# Patient Record
Sex: FEMALE | Race: WHITE | NOT HISPANIC OR LATINO | Employment: UNEMPLOYED | URBAN - METROPOLITAN AREA
[De-identification: names, ages, dates, MRNs, and addresses within clinical notes are randomized per-mention and may not be internally consistent; named-entity substitution may affect disease eponyms.]

---

## 2023-03-30 ENCOUNTER — HOSPITAL ENCOUNTER (OUTPATIENT)
Dept: RADIOLOGY | Facility: HOSPITAL | Age: 33
Discharge: HOME | End: 2023-03-30
Attending: OBSTETRICS & GYNECOLOGY
Payer: MEDICAID

## 2023-03-30 ENCOUNTER — OFFICE VISIT (OUTPATIENT)
Dept: FAMILY MEDICINE | Facility: CLINIC | Age: 33
End: 2023-03-30
Payer: MEDICAID

## 2023-03-30 VITALS
TEMPERATURE: 98 F | BODY MASS INDEX: 45.21 KG/M2 | OXYGEN SATURATION: 98 % | HEART RATE: 105 BPM | SYSTOLIC BLOOD PRESSURE: 111 MMHG | DIASTOLIC BLOOD PRESSURE: 73 MMHG | WEIGHT: 255.19 LBS | RESPIRATION RATE: 20 BRPM | HEIGHT: 63 IN

## 2023-03-30 DIAGNOSIS — F32.A DEPRESSION DURING PREGNANCY, ANTEPARTUM: ICD-10-CM

## 2023-03-30 DIAGNOSIS — K21.9 GASTROESOPHAGEAL REFLUX DISEASE, UNSPECIFIED WHETHER ESOPHAGITIS PRESENT: ICD-10-CM

## 2023-03-30 DIAGNOSIS — F41.9 ANXIETY DURING PREGNANCY, ANTEPARTUM, UNSPECIFIED TRIMESTER: ICD-10-CM

## 2023-03-30 DIAGNOSIS — G43.919 INTRACTABLE MIGRAINE WITHOUT STATUS MIGRAINOSUS, UNSPECIFIED MIGRAINE TYPE: ICD-10-CM

## 2023-03-30 DIAGNOSIS — Z3A.12 12 WEEKS GESTATION OF PREGNANCY: Primary | ICD-10-CM

## 2023-03-30 DIAGNOSIS — O99.340 DEPRESSION DURING PREGNANCY, ANTEPARTUM: ICD-10-CM

## 2023-03-30 DIAGNOSIS — O99.340 ANXIETY DURING PREGNANCY, ANTEPARTUM, UNSPECIFIED TRIMESTER: ICD-10-CM

## 2023-03-30 DIAGNOSIS — K59.00 CONSTIPATION DURING PREGNANCY IN SECOND TRIMESTER: ICD-10-CM

## 2023-03-30 DIAGNOSIS — O99.612 CONSTIPATION DURING PREGNANCY IN SECOND TRIMESTER: ICD-10-CM

## 2023-03-30 DIAGNOSIS — Z34.90 PREGNANCY: ICD-10-CM

## 2023-03-30 LAB
APPEARANCE UR: CLEAR
BACTERIA #/AREA URNS AUTO: ABNORMAL /HPF
BASOPHILS # BLD AUTO: 0.04 X10(3)/MCL (ref 0–0.2)
BASOPHILS NFR BLD AUTO: 0.5 %
BILIRUB SERPL-MCNC: NEGATIVE MG/DL
BILIRUB UR QL STRIP.AUTO: NEGATIVE MG/DL
BLOOD URINE, POC: NEGATIVE
C TRACH DNA SPEC QL NAA+PROBE: NOT DETECTED
CLARITY, POC UA: CLEAR
COLOR UR AUTO: COLORLESS
COLOR, POC UA: YELLOW
EOSINOPHIL # BLD AUTO: 0.09 X10(3)/MCL (ref 0–0.9)
EOSINOPHIL NFR BLD AUTO: 1.1 %
ERYTHROCYTE [DISTWIDTH] IN BLOOD BY AUTOMATED COUNT: 14.2 % (ref 11.5–17)
GLUCOSE UR QL STRIP.AUTO: NORMAL MG/DL
GLUCOSE UR QL STRIP: NEGATIVE
HBV SURFACE AG SERPL QL IA: NONREACTIVE
HCT VFR BLD AUTO: 38 % (ref 37–47)
HCV AB SERPL QL IA: NONREACTIVE
HGB BLD-MCNC: 12.2 G/DL (ref 12–16)
HGB S BLD QL SOLY: NEGATIVE
HIV 1+2 AB+HIV1 P24 AG SERPL QL IA: NONREACTIVE
HYALINE CASTS #/AREA URNS LPF: ABNORMAL /LPF
IMM GRANULOCYTES # BLD AUTO: 0.03 X10(3)/MCL (ref 0–0.04)
IMM GRANULOCYTES NFR BLD AUTO: 0.4 %
INDIRECT COOMBS GEL: NORMAL
KETONES UR QL STRIP.AUTO: NEGATIVE MG/DL
KETONES UR QL STRIP: NEGATIVE
LEUKOCYTE ESTERASE UR QL STRIP.AUTO: 75 UNIT/L
LEUKOCYTE ESTERASE URINE, POC: NORMAL
LYMPHOCYTES # BLD AUTO: 2.37 X10(3)/MCL (ref 0.6–4.6)
LYMPHOCYTES NFR BLD AUTO: 28.9 %
MCH RBC QN AUTO: 26.2 PG (ref 27–31)
MCHC RBC AUTO-ENTMCNC: 32.1 G/DL (ref 33–36)
MCV RBC AUTO: 81.7 FL (ref 80–94)
MONOCYTES # BLD AUTO: 0.44 X10(3)/MCL (ref 0.1–1.3)
MONOCYTES NFR BLD AUTO: 5.4 %
MUCOUS THREADS URNS QL MICRO: ABNORMAL /LPF
N GONORRHOEA DNA SPEC QL NAA+PROBE: NOT DETECTED
NEUTROPHILS # BLD AUTO: 5.24 X10(3)/MCL (ref 2.1–9.2)
NEUTROPHILS NFR BLD AUTO: 63.7 %
NITRITE UR QL STRIP.AUTO: NEGATIVE
NITRITE, POC UA: NEGATIVE
NRBC BLD AUTO-RTO: 0 %
PH UR STRIP.AUTO: 5.5 [PH]
PH, POC UA: 5.5
PLATELET # BLD AUTO: 358 X10(3)/MCL (ref 130–400)
PMV BLD AUTO: 10.6 FL (ref 7.4–10.4)
PROT UR QL STRIP.AUTO: NEGATIVE MG/DL
PROTEIN, POC: NEGATIVE
RBC # BLD AUTO: 4.65 X10(6)/MCL (ref 4.2–5.4)
RBC #/AREA URNS AUTO: ABNORMAL /HPF
RBC UR QL AUTO: NEGATIVE UNIT/L
SP GR UR STRIP.AUTO: 1.01
SPECIFIC GRAVITY, POC UA: 1.01
SQUAMOUS #/AREA URNS LPF: ABNORMAL /HPF
T PALLIDUM AB SER QL: NONREACTIVE
UROBILINOGEN UR STRIP-ACNC: NORMAL MG/DL
UROBILINOGEN, POC UA: 0.2
WBC # SPEC AUTO: 8.2 X10(3)/MCL (ref 4.5–11.5)
WBC #/AREA URNS AUTO: ABNORMAL /HPF

## 2023-03-30 PROCEDURE — 85660 RBC SICKLE CELL TEST: CPT

## 2023-03-30 PROCEDURE — 86787 VARICELLA-ZOSTER ANTIBODY: CPT | Mod: 90

## 2023-03-30 PROCEDURE — 87591 N.GONORRHOEAE DNA AMP PROB: CPT

## 2023-03-30 PROCEDURE — 81001 URINALYSIS AUTO W/SCOPE: CPT

## 2023-03-30 PROCEDURE — 86850 RBC ANTIBODY SCREEN: CPT

## 2023-03-30 PROCEDURE — 76801 OB US < 14 WKS SINGLE FETUS: CPT | Mod: TC

## 2023-03-30 PROCEDURE — 87340 HEPATITIS B SURFACE AG IA: CPT

## 2023-03-30 PROCEDURE — 86803 HEPATITIS C AB TEST: CPT

## 2023-03-30 PROCEDURE — 99204 OFFICE O/P NEW MOD 45 MIN: CPT | Mod: PBBFAC,25

## 2023-03-30 PROCEDURE — 88174 CYTOPATH C/V AUTO IN FLUID: CPT

## 2023-03-30 PROCEDURE — 81002 URINALYSIS NONAUTO W/O SCOPE: CPT | Mod: 59,PBBFAC

## 2023-03-30 PROCEDURE — 36415 COLL VENOUS BLD VENIPUNCTURE: CPT

## 2023-03-30 PROCEDURE — 86762 RUBELLA ANTIBODY: CPT | Mod: 90

## 2023-03-30 PROCEDURE — 85025 COMPLETE CBC W/AUTO DIFF WBC: CPT

## 2023-03-30 PROCEDURE — 87088 URINE BACTERIA CULTURE: CPT

## 2023-03-30 PROCEDURE — 87389 HIV-1 AG W/HIV-1&-2 AB AG IA: CPT

## 2023-03-30 PROCEDURE — 86900 BLOOD TYPING SEROLOGIC ABO: CPT

## 2023-03-30 PROCEDURE — 86780 TREPONEMA PALLIDUM: CPT

## 2023-03-30 RX ORDER — DOCUSATE SODIUM 100 MG/1
100 CAPSULE, LIQUID FILLED ORAL 2 TIMES DAILY
Qty: 60 CAPSULE | Refills: 0 | Status: SHIPPED | OUTPATIENT
Start: 2023-03-30 | End: 2023-05-24

## 2023-03-30 RX ORDER — SERTRALINE HYDROCHLORIDE 50 MG/1
TABLET, FILM COATED ORAL
Qty: 90 TABLET | Refills: 0 | Status: SHIPPED | OUTPATIENT
Start: 2023-03-30 | End: 2023-06-27 | Stop reason: SDUPTHER

## 2023-03-30 RX ORDER — LANSOPRAZOLE 30 MG/1
30 CAPSULE, DELAYED RELEASE ORAL DAILY
Qty: 30 CAPSULE | Refills: 11 | Status: SHIPPED | OUTPATIENT
Start: 2023-03-30 | End: 2023-11-16

## 2023-03-30 RX ORDER — OFLOXACIN 3 MG/ML
10 SOLUTION AURICULAR (OTIC) DAILY
Qty: 5 ML | Refills: 0 | Status: SHIPPED | OUTPATIENT
Start: 2023-03-30 | End: 2023-04-26
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
## 2023-03-30 NOTE — PATIENT INSTRUCTIONS
Well Child Exam    About this topic  A well child exam is a visit with your child's doctor to check your child's health. The doctor will check your child's growth, progress, and shot record. It is also a time for you to ask your child's doctor any questions you have about your child's health. Your child will have a full exam during the office visit. Other things that are sometimes checked are hearing, eyesight, and urine or blood tests. The doctor may give shots during your child's well visit.    General    Getting Ready for a Well Child Exam    A well child exam is a good time for you to talk with your child's doctor about any of these topics:    Eating habits or diet    How your child acts    Sleep issues    Growth    Safety    Vaccines    Toilet training    Teen years    How your child is doing in school or any learning concerns    Home life    You may want to make a written list of the things you want to talk about with your child's doctor. Be sure to bring your list of questions to your child's well visit. You may also want to do some research on your own before your office visit by reading books or looking at Web sites. Other family members, child caregivers, and grandparents may be able to help you too. Your child's doctor may ask also you about your family's health history or if your child is around anyone who smokes.    The Exam    The doctor measures your child's weight, height, and sometimes head size or body mass index (BMI). The doctor plots these numbers on a growth curve. The growth curve gives a picture of your baby's growth at each visit. The doctor may check your child's temperature, blood pressure, breathing, and heart rate. The doctor may listen to your child's heart, lungs, and belly. Your doctor will do a full exam of your child from the head to the toes.    Growth and Development Questions    Your doctor will ask you about your child's progress. The doctor will focus on the skills that are  likely to happen at your child's age. Some of these are motor skills like rolling over, walking, and running, while others are social skills, or how your child interacts with other people. Your child's doctor will also ask you how your child is doing in school.    Help for Parents    Your doctor will talk with you about any concerns you have about your child during this visit. The doctor may also talk with you about:    Getting family help or other support    Ways to help your child's brain growth    How your child plays and acts with others    Ways to help your child exercise    Safety    Eating habits    Vaccines    Quitting smoking    Help if you have a low mood after having a baby    Shots or Vaccines    It is important for your child to get shots on time. This protects from very serious illnesses like pertussis, measles, or some kinds of pneumonia. Sometimes, your child may need more than one dose of vaccine. The vaccines used today are safer than ever. Talk to your doctor if you have any questions or concerns about giving your child vaccines.    Well Child Exam Schedule    The American Academy of Pediatrics (AAP) suggests this plan for well child visits:    [place] (3 to 5 days old)    1 month old    2 months old    4 months old    6 months old    9 months old    12 months old    15 months old    18 months old    2 years old    30 months old    3 years old    4 years old    Once each year until age 21    Well child exams are very important. Since your child is healthy at this visit and it is scheduled ahead of time, you can think about things you want to ask your child's doctor. Be sure to follow the above plan for well child visits as well as any other visits your child's doctor suggests.    Where can I learn more?    Centers for Disease Control and Prevention    http://www.cdc.gov/vaccines     Healthy  Children    https://www.healthychildren.org/English/family-life/health-management/Pages/Well-Child-Care-A-Check-Up-for-Success.aspx    Disclaimer.  This generalized information is a limited summary of diagnosis, treatment, and/or medication information. It is not meant to be comprehensive and should be used as a tool to help the user understand and/or assess potential diagnostic and treatment options. It does NOT include all information about conditions, treatments, medications, side effects, or risks that may apply to a specific patient. It is not intended to be medical advice or a substitute for the medical advice, diagnosis, or treatment of a health care provider based on the health care provider's examination and assessment of a patients specific and unique circumstances. Patients must speak with a health care provider for complete information about their health, medical questions, and treatment options, including any risks or benefits regarding use of medications. This information does not endorse any treatments or medications as safe, effective, or approved for treating a specific patient. [employer] and its affiliates disclaim any warranty or liability relating to this information or the use thereof. The use of this information is governed by the Terms of Use, available at Terms of Use. ©2022 [employer] and its affiliates and/or licensors. All rights reserved.

## 2023-04-01 LAB
BACTERIA UR CULT: NO GROWTH
RUBV IGG SERPL IA-ACNC: 2
RUBV IGG SERPL QL IA: POSITIVE
VZV IGG SER IA-ACNC: 2.9
VZV IGG SER QL IA: POSITIVE

## 2023-04-03 LAB — ABORH RETYPE: NORMAL

## 2023-04-03 PROCEDURE — 86900 BLOOD TYPING SEROLOGIC ABO: CPT | Performed by: OBSTETRICS & GYNECOLOGY

## 2023-04-04 LAB — PSYCHE PATHOLOGY RESULT: NORMAL

## 2023-04-05 ENCOUNTER — PATIENT OUTREACH (OUTPATIENT)
Dept: FAMILY MEDICINE | Facility: CLINIC | Age: 33
End: 2023-04-05
Payer: MEDICAID

## 2023-04-05 NOTE — PROGRESS NOTES
SDOH Questionnaire  Which of the following best describes your current living situation? (Select ONE only)  [] Live alone in my own home (house, apartment, condo, trailer, etc.)   [x] Live in a household with other people (roommates, family, friends.)  [] Live in a residential facility where meals/ household help are routinely provided by paid staff (or could be if requested)  [] Live in a facility such as a nursing home which provides meals and 24-hour nursing care  [] Temporarily staying with a relative or friend  [] Temporarily staying in a shelter or homeless  [] Other:       Do you have any concerns about your current living situation, like housing conditions, safety, and costs?  []Condition of housing []Lack of more permanent housing []Ability to pay for housing or utilities    []Feeling safe  [x] No concerns    []Other:     In the past 3 months, did you have trouble paying for any of the following? (Select ALL that apply)  []Food   []Housing []Heat and electricity []Medical needs  []Transportation  []Childcare  []None of these  []Other:     In the past 3 months, how often have you worried that your food would run out before you had money to buy more?   [x]Never  []Sometimes []Often  []Very often  Has lack of transportation kept you from medical appointments or doing daily living tasks? (Select ALL that apply)  [] Kept me from medical appointments or from getting medications  [] Kept me from doing things needed for daily living  [x] Not a problem for me    In the last month, how often have you felt difficulties were piling up so high that you could not overcome them?  []Never  []Almost never [x]Sometimes []Fairly often []Very often    Which of the following would you like to receive help with at this time? (Select ALL that apply)  []Food      []Activities of daily living            []Housing     []Childcare/other child-related issues  []Transportation     []Applying for public benefits (SSI, Medicaid,  SNAP)  []Utilities (heat, electricity, water, etc.)  []Legal issues  []Medical care, medicine, medical supplies  []Employment  []Dental services     []Other   []Vision services     [x]None of the above     Who answered these questions?                    CR     Patient alone        Patient with help     Family member, friend, or caregiver of patient         Follow-up: 5/3/2023    Appointment reminder given for 4/24/2023        Patient verbalized understanding.        Other comments:  Identity verified.  Pt denies abdominal pain, vaginal bleeding.  She denies SI/HI.  Instructed if she develops SI/HI to present to the ED for evaluation, educated on 988.  Offered to mail [facility] mental health education/resources, pt accepted.  Pt states she has not had a dental appt in 2 years.  Educated on the benefits of oral cleanings every 6 months.  Offered to mail [facility] oral health education/resources, pt accepted.  Pt states she had an eye exam 1/2023.  Pt states she does not have a PCP.  Educated on the benefits of having a PCP and instructed pt an appt would be made to establish care after she delivers the baby.  Educated on when/where to get medical services.  Patient denies any [facility] barriers at this time. Stressed the importance of medication compliance, keeping appointments and instructed on the use of urgent care clinic for non emergent issues when PCP unavailable.  Patient verbalized understanding to all instructions.                     Education given on

## 2023-04-05 NOTE — PATIENT INSTRUCTIONS
If you have any questions call Daniela [phone].           Why Should I Have My Own Doctor or Nurse Practitioner (PCP) to Take Care of Me  What is a PCP (Primary Care Provider)?    A primary care provider is a doctor or nurse practitioner who you can call for an appointment and will see you when you are sick.    You will also be seen at scheduled appointment times during the year to check on your diabetes, or high blood pressure, or heart disease.    Why see the same PCP (doctor/nurse practitioner)?    You can be seen faster when you are sick           You, the PCP (doctor/nurse practitioner) and the office staff get to know each other; you begin to trust them to care for you. You take part in your health choices.   All of you together are a team.    Your medicine is looked at every time you visit, to be sure you are taking the medicine, as the PCP (doctor/nurse practitioner) ordered.    Your PCP (doctor/nurse practitioner) and their staff help keep you healthy and out of the hospital.  They can catch sicknesses earlier by ordering tests once a year to stop or prevent the sickness from getting worse.      Your PCP (doctor/nurse practitioner) can send you to providers who specialize (heart/bone/lung) if you need.  They and their office staff help keep track of your seeing other providers (doctors/nurse practitioners) and tests (CT/ MRIs/ X Rays)) taken.        PCPs want you to stay healthy.  Let us care for you.                       What Do I Do If I Wake Up Sick                                                     If you wake up sick, or you start to fill sick during the day, try these tips to get care and start to feel better soon.                                                                                                                              As soon as you start to feel bad, call your doctor's office and ask for same day or next day visit appointment.        If you cannot be seen with your doctor's  office within 24 hours, you can go to an urgent care and be seen.          How to stay well:     Take your medicine as ordered by your doctor      Fill your Medicine before you run out      Exercise       Enjoy walking in the sunlight daily  Keep your scheduled clinic appointments      Keep you scheduled yearly wellness visits                Budget-Friendly Healthy Eating    Save money at the grocery store and eat healthy.   Buy groceries keeping your budget in mind  Make a grocery list and only buy what you have on the list  Eat food you cook or have at home; limit fast food or eating out    Compare food labels  Look at store brand food labels and compare to brand names, often food value is the same and the store brand is cheaper      Look for products that do not have sugar, fat, or salt (sodium) added.  These often cost the same but are healthier for you.  They may be labeled as:  ?Sugar-free.  ?Nonfat.              ?Low-fat.  ?Sodium-free.  ?Low sodium.  Look for lean ground beef labeled as at least 92% lean and 8% fat.        Shopping    Buy only the items on your grocery list and go only to the areas of the store that have the items on your list.  Use coupons only for foods and brands you normally buy. Avoid buying items you wouldn't normally buy simply because they are on sale.  Check online and in newspapers for weekly deals.  Buy healthy items from the bulk bins when available, such as herbs, spices, flour, pasta, nuts, and dried fruit.  Buy fruits and vegetables that are in season. Prices are usually lower on in-season produce.  Look at the unit price on the price tag. Use it to compare different brands and sizes to find out which item is the best deal.  Choose healthy items that are often low-cost, such as carrots, potatoes, apples, bananas, and oranges. Dried or canned beans are a low-cost protein source.      Buy in bulk and freeze extra food. Items you can buy in bulk include meats, fish, poultry,  "frozen fruits, and frozen vegetables.  Avoid buying "ready-to-eat" foods, such as pre-cut fruits and vegetables and pre-made salads.  If possible, shop around to discover where you can find the best prices. Consider other retailers such as dollar stores, larger wholesale stores, local fruit and vegetable stands, and farmers markets.  Do not shop when you are hungry. If you shop while hungry, it may be hard to stick to your list and budget.      Resist impulse buying. Use your grocery list as your official plan for the week.      Buy a variety of vegetables and fruits by purchasing fresh, frozen, and canned items.  Look at the top and bottom shelves for deals. Foods at eye level (eye level of an adult or child) are usually more expensive.  Be efficient with your time when shopping. The more time you spend at the store, the more money you are likely to spend.  To save money when choosing more expensive foods like meats and dairy:  ?Choose cheaper cuts of meat, such as bone-in chicken thighs and drumsticks instead of skinless and boneless chicken. When you are ready to prepare the chicken, you can remove the skin yourself to make it healthier.  ?Choose lean meats like chicken or turkey instead of beef.  ?Choose canned seafood, such as tuna, salmon, or sardines.  ?Buy eggs as a low-cost source of protein.  ?Buy dried beans and peas, such as lentils, split peas, or kidney beans instead of meats. Dried beans and peas are a good alternative source of protein.  ?Buy the larger tubs of yogurt instead of individual-sized containers.                        Choose water instead of sodas and other sweetened beverages.  Avoid buying chips, cookies, and other "junk food." These items are usually expensive and not healthy.  Meal planning  Do not eat out or get fast food. Prepare food at home.  Make a grocery list and make sure to bring it with you to the store.   Plan meals and snacks according to a grocery list and budget you " "create.  Use leftovers in your meal plan for the week.  Look for recipes where you can cook once and make enough food for two meals.  Include budget-friendly meals like stews, casseroles, and stir-patel dishes.  Try some meatless meals or try "no cook" meals like salads.  Make sure that half your plate is filled with fruits or vegetables. Choose from fresh, frozen, or canned fruits and vegetables. If eating canned, remember to rinse them before eating. This will remove any excess salt added for packaging.            Summary  Eating healthy on a budget is possible if you plan your meals according to your budget, buy according to your budget and grocery list, and prepare food yourself.   Tips for buying more food on a limited budget include buying generic brands, using coupons only for foods you normally buy, and buying healthy items from the bulk bins when available.  Tips for buying cheaper food to replace expensive food include choosing cheaper, lean cuts of meat, and buying dried beans and peas.    Discuss any question you have with your doctor.              Why is taking care of your mouth/teeth/gums important?     Your mouth is the opening to your body.  If not kept clean, it can let in sickness to the rest of your body.     Oral Health Care (Dentists)                 City:  Provider Address Phone Number Insurance Plan   [place]:  None available                    Padma Gordon, [place] [address] [facility] Padma FAJADRO [phone]   ADULTS ONLY Medicaid:  HB & AETNA ONLY             [place]         Dentures and Dental Service ([facility]) [address] [phone]  DENTURES ONLY ADULT Medicaid:  HB & AETNA ONLY             [facility] [address] [facility] [phone] All Medicaid/Medicare             Talia Gordillo, [place] 4292  [facility] Talia Spear [phone] Medicaid Children only 2 to 21             [place]  "        Rajinder Garcia [facility] [phone] Accepts:   LHC, HB, Aetna         [facility] [phone] Medicare             [facility] [address], [place] [phone] Medicaid Children only 2 to 21             [facility] [address], [place] [phone] Medicaid Children only 2 to 21             [facility]  [address], [place] [phone] Medicaid Children only 5 yrs and younger:  lip/tongue tie             [facility] 13[address] [facility] [phone] Medicaid Children only 2 to 21          [facility]  409 [facility]  [phone]           [facility] 100[address] [facility] [phone] All Medicaid/Medicare :  ADULTS             [facility] 500 [facility] [phone] All Medicaid/Medicare :  ADULTS             [address] [phone] Medicaid Children only 2 to 21             [facility]  [address] [place] [phone] Medicaid Children only 2 to 21             Dr. Nga Larson,  [facility] [phone] Medicare for Dentures Only             [facility] 8762 [facility] [phone] All Medicaid/Medicare :   EXCEPT [facility]; ADULTS         Jose Flores [address] [facility] [phone] Accepts:   LHC, HB, Aetna             [address] [phone]  UHC, IHC, HB, Aetna/Medicare :  ADULTS     [facility]; [place]: [phone]  [facility]; [place]: [phone]                 How does drug/alcohol abuse affect your health?          Drug and alcohol abuse can cause your breathing to slow down enough  for you to stop breathing. It can cause you to see things that are not there. Keep you from sleeping for days.  Continued use of drugs and alcohol will weaken your heart, liver, and kidneys.        778 is the three-digit nationwide phone number to connect directly to crisis lifeline.  BY calling or texting 98, you will connect with mental health professions.  Veterans can press 1 after dialing [phone] to connect directly to Veterans Crisis lifeline.      Mental Health/Substance Abuse  [facility]:  [phone]  [facility]: [phone]  [facility]: [phone]    [facility]  [address] [place] [address]  Phone: [phone]  Fax:  [phone]    [facility]  [address]  [address]  Phone:  [phone]  Fax:  [phone]          [facility]  [address] [place]  [address]  Phone:  [phone]  Fax:  [phone]    [facility]  [address] [place]  [address]  Phone:  [phone]  Fax:  [phone]    [facility]  [address] [place] [address]  Phone: [phone]  Fax:  [phone]    [facility]  [address] [place]  [address]  Phone:  [phone]  Fax: [phone]    [facility]  [address]        [place], [place]  Phone:  [phone]    [facility]       [address]        [place], [place]  [phone]      [facility]   [address] [place] [address]   Phone: [phone]   Medicaid Accepted Plans  Healthy Blue   [facility] on hold at present                                                      Medicaid Accepted Plans    [facility] Healthy Blue   [address] [facility] [place] [address] [facility]   Phone:[phone]     Healthy Blue   [address] [place]   [place] [address] [facility]    Phone:[phone]      [facility] Healthy Blue   [address] Aetna   [address] [facility]   [place] [address] [facility]   Phone:  [phone]      [facility] Aetna   [address] [facility] Healthy Blue   [address] [facility]   [place] [address]    Phone: [phone]      [facility] All   [address] [facility] [place] [address]    Phone: [phone]        [facility] All   [address] [place] [address]    Phone: [phone]         All     [address] [place] [address]    Phone: [phone]     All   [address] [place] [address]    Phone: [phone]        [facility] All   [address] [place] [address]    Phone: [phone]        [facility] All   [address] [facility]    [address]    [place] [address]    Phone: [phone]        [facility] All   [address] [facility]   [address]    [place] [address]    Phone: [phone]        [facility] All   [address] [place] [address]    Phone: [phone]        [address] [place] [address]    Phone: [phone]            [facility] All   [address] [place] [place] [address]    Phone: [phone]              [employer]       Aetna   [address] Healthy Blue   [address] [facility]   [place] [address]    Phone: [phone]        [address] [place] [address] Healthy Blue   Phone: [phone] [facility]       [address] Aetna   [address] Healthy Blue   [place] [address] [facility]   Phone: [phone]        [facility] All   [address] [place] [address]    Phone: [phone] [address] [place] [address]    [phone]        [address] [place] [address]  "[place] [address]    Phone: [phone]        [facility] All   [address]    [address]    [place] [address]    Phone: [phone]    Counseling only can be self-referral        [address] All   [place], [place] [address] [phone]              [address]       All   [address]    [place] [address]    Phone: [phone]          [facility]   [facility]   [address] Healthy Blue   [place] [address] [facility]   Phone: [phone]            [facility]     All   [address]    [address]    [place] [address]    Phone: [phone]    Julianne Rouse/Aliyah Torres      [employer] Medicaid  Substance Abuse Rehabilitation Facility,   [facility]  [facility]  [phone]                   [facility] Medicaid Eye Clinics      [facility]  [address] [place] [address]  Phone: [phone]  (Accepts all Medicaid for visit and glasses)     [facility]  [address] [place] [address]  Phone: [phone]  (Only [facility] Medicaid for visit)  (All plans for glasses)     [facility]   [address] [place] [address]  Phone: [phone]  (All Medicaid plans for visit and glasses)  (Only 1 doctor that comes twice a month)        [facility]  [address] [place] [address]  Phone: [phone]  (No Medicaid for visit)  (Aetna, [facility], [facility] Healthy Blue for glasses)    [facility]  [address] [place] [address]  phone: [phone]  (Accepts all Medicaid)                 [facility]  [address] [address]  phone: [phone]  (Accepts [facility], [facility] and [facility] Medicaid)     [facility]  [address] [place], [place]; [address]  phone" [phone]  (Accepts all Medicaid)              "

## 2023-04-10 NOTE — PROGRESS NOTES
I reviewed History, PE, A/P and medical record.  Services provided in outpatient department of a teaching hospital[facility]facility, I was immediately available.  I agree with resident. Care provided was reasonable and necessary.   I evaluated the patient with resident at time of visit.

## 2023-04-17 DIAGNOSIS — Z3A.15 15 WEEKS GESTATION OF PREGNANCY: Primary | ICD-10-CM

## 2023-04-24 ENCOUNTER — PATIENT OUTREACH (OUTPATIENT)
Dept: FAMILY MEDICINE | Facility: CLINIC | Age: 33
End: 2023-04-24
Payer: MEDICAID

## 2023-04-24 NOTE — PROGRESS NOTES
Follow-up:    Appointment reminder given for Maternal Fetal Medicine ultrasound 4/26/2023 0930, Office Visit 1000 and [facility] OB 1100.  Provided address for Maternal Fetal Medicine Clinic.  Patient verbalized understanding.         Patient verbalized understanding. Yes        Other comments:                    Education given on

## 2023-04-26 ENCOUNTER — PROCEDURE VISIT (OUTPATIENT)
Dept: MATERNAL FETAL MEDICINE | Facility: CLINIC | Age: 33
End: 2023-04-26
Payer: MEDICAID

## 2023-04-26 ENCOUNTER — OFFICE VISIT (OUTPATIENT)
Dept: FAMILY MEDICINE | Facility: CLINIC | Age: 33
End: 2023-04-26
Payer: MEDICAID

## 2023-04-26 ENCOUNTER — OFFICE VISIT (OUTPATIENT)
Dept: MATERNAL FETAL MEDICINE | Facility: CLINIC | Age: 33
End: 2023-04-26
Payer: MEDICAID

## 2023-04-26 VITALS
TEMPERATURE: 98 F | OXYGEN SATURATION: 97 % | HEIGHT: 62 IN | SYSTOLIC BLOOD PRESSURE: 107 MMHG | BODY MASS INDEX: 47.48 KG/M2 | RESPIRATION RATE: 18 BRPM | HEART RATE: 88 BPM | WEIGHT: 258 LBS | DIASTOLIC BLOOD PRESSURE: 72 MMHG

## 2023-04-26 VITALS
SYSTOLIC BLOOD PRESSURE: 135 MMHG | BODY MASS INDEX: 47.39 KG/M2 | HEART RATE: 97 BPM | DIASTOLIC BLOOD PRESSURE: 70 MMHG | WEIGHT: 257.5 LBS | HEIGHT: 62 IN

## 2023-04-26 DIAGNOSIS — O21.9 VOMITING OR NAUSEA OF PREGNANCY: ICD-10-CM

## 2023-04-26 DIAGNOSIS — H43.393 FLOATERS IN VISUAL FIELD, BILATERAL: ICD-10-CM

## 2023-04-26 DIAGNOSIS — F41.9 ANXIETY AND DEPRESSION: ICD-10-CM

## 2023-04-26 DIAGNOSIS — E66.01 OBESITY, CLASS III, BMI 40-49.9 (MORBID OBESITY): ICD-10-CM

## 2023-04-26 DIAGNOSIS — O99.212 OBESITY AFFECTING PREGNANCY IN SECOND TRIMESTER: ICD-10-CM

## 2023-04-26 DIAGNOSIS — O99.342 MENTAL DISORDER DURING PREGNANCY IN SECOND TRIMESTER: ICD-10-CM

## 2023-04-26 DIAGNOSIS — Z3A.15 15 WEEKS GESTATION OF PREGNANCY: ICD-10-CM

## 2023-04-26 DIAGNOSIS — F32.A ANXIETY AND DEPRESSION: ICD-10-CM

## 2023-04-26 DIAGNOSIS — Z86.69 HISTORY OF MIGRAINE: ICD-10-CM

## 2023-04-26 DIAGNOSIS — Z3A.16 16 WEEKS GESTATION OF PREGNANCY: Primary | ICD-10-CM

## 2023-04-26 DIAGNOSIS — O09.299 HISTORY OF CERVICAL INCOMPETENCE IN PREGNANCY, CURRENTLY PREGNANT: ICD-10-CM

## 2023-04-26 DIAGNOSIS — R55 PRE-SYNCOPE: ICD-10-CM

## 2023-04-26 DIAGNOSIS — O09.292 PRIOR POOR OBSTETRICAL HISTORY IN SECOND TRIMESTER, ANTEPARTUM: ICD-10-CM

## 2023-04-26 PROBLEM — O09.212 PREVIOUS PRETERM DELIVERY IN SECOND TRIMESTER, ANTEPARTUM: Status: ACTIVE | Noted: 2023-04-26

## 2023-04-26 LAB
ALBUMIN SERPL-MCNC: 3.1 G/DL (ref 3.5–5)
ALBUMIN/GLOB SERPL: 0.8 RATIO (ref 1.1–2)
ALP SERPL-CCNC: 59 UNIT/L (ref 40–150)
ALT SERPL-CCNC: 12 UNIT/L (ref 0–55)
AST SERPL-CCNC: 16 UNIT/L (ref 5–34)
BASOPHILS # BLD AUTO: 0.04 X10(3)/MCL (ref 0–0.2)
BASOPHILS NFR BLD AUTO: 0.5 %
BILIRUB SERPL-MCNC: NEGATIVE MG/DL
BILIRUBIN DIRECT+TOT PNL SERPL-MCNC: 0.3 MG/DL
BLOOD URINE, POC: NEGATIVE
BUN SERPL-MCNC: 6.8 MG/DL (ref 7–18.7)
CALCIUM SERPL-MCNC: 9 MG/DL (ref 8.4–10.2)
CHLORIDE SERPL-SCNC: 104 MMOL/L (ref 98–107)
CLARITY, POC UA: CLEAR
CO2 SERPL-SCNC: 23 MMOL/L (ref 22–29)
COLOR, POC UA: NORMAL
CREAT SERPL-MCNC: 0.58 MG/DL (ref 0.55–1.02)
EOSINOPHIL # BLD AUTO: 0.08 X10(3)/MCL (ref 0–0.9)
EOSINOPHIL NFR BLD AUTO: 1 %
ERYTHROCYTE [DISTWIDTH] IN BLOOD BY AUTOMATED COUNT: 14.7 % (ref 11.5–17)
GFR SERPLBLD CREATININE-BSD FMLA CKD-EPI: >60 MLS/MIN/1.73/M2
GLOBULIN SER-MCNC: 3.8 GM/DL (ref 2.4–3.5)
GLUCOSE SERPL-MCNC: 69 MG/DL (ref 74–100)
GLUCOSE UR QL STRIP: NEGATIVE
GROUP & RH: NORMAL
HCT VFR BLD AUTO: 36 % (ref 37–47)
HGB BLD-MCNC: 11.5 G/DL (ref 12–16)
IMM GRANULOCYTES # BLD AUTO: 0.03 X10(3)/MCL (ref 0–0.04)
IMM GRANULOCYTES NFR BLD AUTO: 0.4 %
INDIRECT COOMBS GEL: NORMAL
KETONES UR QL STRIP: NEGATIVE
LEUKOCYTE ESTERASE URINE, POC: NEGATIVE
LYMPHOCYTES # BLD AUTO: 2.4 X10(3)/MCL (ref 0.6–4.6)
LYMPHOCYTES NFR BLD AUTO: 29.8 %
MAGNESIUM SERPL-MCNC: 1.9 MG/DL (ref 1.6–2.6)
MCH RBC QN AUTO: 26.4 PG (ref 27–31)
MCHC RBC AUTO-ENTMCNC: 31.9 G/DL (ref 33–36)
MCV RBC AUTO: 82.8 FL (ref 80–94)
MONOCYTES # BLD AUTO: 0.45 X10(3)/MCL (ref 0.1–1.3)
MONOCYTES NFR BLD AUTO: 5.6 %
NEUTROPHILS # BLD AUTO: 5.05 X10(3)/MCL (ref 2.1–9.2)
NEUTROPHILS NFR BLD AUTO: 62.7 %
NITRITE, POC UA: NEGATIVE
NRBC BLD AUTO-RTO: 0 %
PH, POC UA: 8.5
PHOSPHATE SERPL-MCNC: 3.5 MG/DL (ref 2.3–4.7)
PLATELET # BLD AUTO: 298 X10(3)/MCL (ref 130–400)
PMV BLD AUTO: 11 FL (ref 7.4–10.4)
POTASSIUM SERPL-SCNC: 3.7 MMOL/L (ref 3.5–5.1)
PROT SERPL-MCNC: 6.9 GM/DL (ref 6.4–8.3)
PROTEIN, POC: NORMAL
RBC # BLD AUTO: 4.35 X10(6)/MCL (ref 4.2–5.4)
SODIUM SERPL-SCNC: 136 MMOL/L (ref 136–145)
SPECIFIC GRAVITY, POC UA: 1.01
SPECIMEN OUTDATE: NORMAL
TSH SERPL-ACNC: 0.9 UIU/ML (ref 0.35–4.94)
UROBILINOGEN, POC UA: 0.2
WBC # SPEC AUTO: 8.1 X10(3)/MCL (ref 4.5–11.5)

## 2023-04-26 PROCEDURE — 3075F PR MOST RECENT SYSTOLIC BLOOD PRESS GE 130-139MM HG: ICD-10-PCS | Mod: CPTII,S$GLB,, | Performed by: OBSTETRICS & GYNECOLOGY

## 2023-04-26 PROCEDURE — 76815 OB US LIMITED FETUS(S): CPT | Mod: S$GLB,,, | Performed by: OBSTETRICS & GYNECOLOGY

## 2023-04-26 PROCEDURE — 3008F PR BODY MASS INDEX (BMI) DOCUMENTED: ICD-10-PCS | Mod: CPTII,S$GLB,, | Performed by: OBSTETRICS & GYNECOLOGY

## 2023-04-26 PROCEDURE — 76817 PR US, OB, TRANSVAG APPROACH: ICD-10-PCS | Mod: S$GLB,,, | Performed by: OBSTETRICS & GYNECOLOGY

## 2023-04-26 PROCEDURE — 81511 FTL CGEN ABNOR FOUR ANAL: CPT | Mod: 90

## 2023-04-26 PROCEDURE — 85025 COMPLETE CBC W/AUTO DIFF WBC: CPT

## 2023-04-26 PROCEDURE — 86900 BLOOD TYPING SEROLOGIC ABO: CPT | Performed by: STUDENT IN AN ORGANIZED HEALTH CARE EDUCATION/TRAINING PROGRAM

## 2023-04-26 PROCEDURE — 99204 OFFICE O/P NEW MOD 45 MIN: CPT | Mod: 25,TH,S$GLB, | Performed by: OBSTETRICS & GYNECOLOGY

## 2023-04-26 PROCEDURE — 76817 TRANSVAGINAL US OBSTETRIC: CPT | Mod: S$GLB,,, | Performed by: OBSTETRICS & GYNECOLOGY

## 2023-04-26 PROCEDURE — 36415 COLL VENOUS BLD VENIPUNCTURE: CPT

## 2023-04-26 PROCEDURE — 81002 URINALYSIS NONAUTO W/O SCOPE: CPT | Mod: PBBFAC

## 2023-04-26 PROCEDURE — 3078F PR MOST RECENT DIASTOLIC BLOOD PRESSURE < 80 MM HG: ICD-10-PCS | Mod: CPTII,S$GLB,, | Performed by: OBSTETRICS & GYNECOLOGY

## 2023-04-26 PROCEDURE — 84100 ASSAY OF PHOSPHORUS: CPT

## 2023-04-26 PROCEDURE — 76815 PR  US,PREGNANT UTERUS,LIMITED, 1/> FETUSES: ICD-10-PCS | Mod: S$GLB,,, | Performed by: OBSTETRICS & GYNECOLOGY

## 2023-04-26 PROCEDURE — 99215 OFFICE O/P EST HI 40 MIN: CPT | Mod: PBBFAC

## 2023-04-26 PROCEDURE — 83735 ASSAY OF MAGNESIUM: CPT

## 2023-04-26 PROCEDURE — 99204 PR OFFICE/OUTPT VISIT, NEW, LEVL IV, 45-59 MIN: ICD-10-PCS | Mod: 25,TH,S$GLB, | Performed by: OBSTETRICS & GYNECOLOGY

## 2023-04-26 PROCEDURE — 3008F BODY MASS INDEX DOCD: CPT | Mod: CPTII,S$GLB,, | Performed by: OBSTETRICS & GYNECOLOGY

## 2023-04-26 PROCEDURE — 3078F DIAST BP <80 MM HG: CPT | Mod: CPTII,S$GLB,, | Performed by: OBSTETRICS & GYNECOLOGY

## 2023-04-26 PROCEDURE — 3075F SYST BP GE 130 - 139MM HG: CPT | Mod: CPTII,S$GLB,, | Performed by: OBSTETRICS & GYNECOLOGY

## 2023-04-26 PROCEDURE — 84443 ASSAY THYROID STIM HORMONE: CPT

## 2023-04-26 PROCEDURE — 80053 COMPREHEN METABOLIC PANEL: CPT

## 2023-04-26 PROCEDURE — 93005 ELECTROCARDIOGRAM TRACING: CPT

## 2023-04-26 RX ORDER — PYRIDOXINE HCL (VITAMIN B6) 25 MG
25 TABLET ORAL 3 TIMES DAILY
Qty: 90 TABLET | Refills: 0 | Status: SHIPPED | OUTPATIENT
Start: 2023-04-26 | End: 2023-05-26

## 2023-04-26 RX ORDER — DOXYLAMINE SUCCINATE 25 MG/1
25 TABLET ORAL NIGHTLY
Qty: 30 TABLET | Refills: 0 | Status: SHIPPED | OUTPATIENT
Start: 2023-04-26 | End: 2023-05-16

## 2023-04-26 RX ORDER — FLUTICASONE PROPIONATE 50 MCG
SPRAY, SUSPENSION (ML) NASAL
COMMUNITY
Start: 2022-05-16 | End: 2023-11-16

## 2023-04-26 RX ORDER — ONDANSETRON HYDROCHLORIDE 8 MG/1
8 TABLET, FILM COATED ORAL 3 TIMES DAILY
COMMUNITY
Start: 2023-03-29 | End: 2023-09-26

## 2023-04-26 RX ORDER — OMEPRAZOLE 40 MG/1
CAPSULE, DELAYED RELEASE ORAL
COMMUNITY
End: 2023-04-26

## 2023-04-26 RX ORDER — B-COMPLEX WITH VITAMIN C
1 TABLET ORAL
COMMUNITY
Start: 2023-03-30 | End: 2023-09-19 | Stop reason: SDUPTHER

## 2023-04-26 NOTE — ASSESSMENT & PLAN NOTE
She reports a history of anxiety and depression. She is taking Zoloft 50mg daily. She reports improvement of symptoms with the utilization of this medication regimen. Discussed increased risk for peripartum and postpartum mood disorders. Precautions provided.      We discussed the usage of SSRIs in pregnancy and the minimal risks associated with the fetus.      We have discussed the importance of optimization of mental health conditions during pregnancy in an effort to reduce the risk of postpartum mood disorders. We have discussed options for management including psychotherapy, counseling, cognitive behavioral therapy, exercise/yoga, journaling, and psychiatry services. She will notify our office if she is interested in being referred for any of the above.

## 2023-04-26 NOTE — PROGRESS NOTES
MATERNAL-FETAL MEDICINE   CONSULT NOTE    Provider requesting consultation: [facility]    SUBJECTIVE:     Ms. Earlene Camilo is a 32 y.o.  female with IUP at 16w3d who is seen in consultation by MAKAYLA for evaluation and management of:  Problem   1. Prior poor obstetrical history in second trimester, antepartum   2. BMI affecting pregnancy in second trimester   3. Anxiety/depression/hx PP depression during pregnancy in second trimester     She and her mother present today for her appointment due to concerns for history of  birth.  Her pregnancy history is as follows:  G1-18 weeks fetus found not to have heart tones-labor was induced, unknown etiology   G2- labor concerns at 20 weeks-had cerclage placed at that time, delivered full-term  G3-full-term delivery without complications (no cerclage or progesterone)   G4-full-term delivery without complications (no cerclage or progesterone)-postpartum course complicated by meconium aspiration and pneumonia-NICU for 1 month  All deliveries were vaginal.     She has a history of anxiety and depression and takes Zoloft 50 mg daily.  She also has a diagnosis of ADHD and was previously on Adderall which she discontinued at her positive pregnancy test.  She is feeling well and has no issues currently.  She has excellent family support.    She has a latex allergy.         Medication List with Changes/Refills   Current Medications    DOCUSATE SODIUM (COLACE) 100 MG CAPSULE    Take 1 capsule (100 mg total) by mouth 2 (two) times daily.    FLUTICASONE PROPIONATE (FLONASE) 50 MCG/ACTUATION NASAL SPRAY    fluticasone propionate Take 2 Spray(s) (nasal) 1 time per day for 30 days 2 sprays each nostril once daily. 2022 spray,suspension 1 time per day nasal 30 days active 50 mcg/actuation    LANSOPRAZOLE (PREVACID) 30 MG CAPSULE    Take 1 capsule (30 mg total) by mouth once daily.    OFLOXACIN (FLOXIN) 0.3 % OTIC SOLUTION    Place 10 drops into the left ear once daily.     "OMEPRAZOLE (PRILOSEC) 40 MG CAPSULE    1 capsule 30 minutes before morning meal Orally Once a day    ONDANSETRON (ZOFRAN) 8 MG TABLET    Take 8 mg by mouth 3 (three) times daily.    PRENATAL VITAMIN 27 MG IRON- 0.8 MG TAB    Take 1 tablet by mouth.    SERTRALINE (ZOLOFT) 50 MG TABLET    Take 0.5 tablets (25 mg total) by mouth every evening for 7 days, THEN 1 tablet (50 mg total) every evening.       Review of patient's allergies indicates:   Allergen Reactions    Latex, natural rubber Rash       PMH:  Past Medical History:   Diagnosis Date    Anemia     Mental disorder     Postpartum depression        PObHx:  OB History    Para Term  AB Living   5 3 3 0 1 3   SAB IAB Ectopic Multiple Live Births   1 0 0 0 3      # Outcome Date GA Lbr Jarret/2nd Weight Sex Delivery Anes PTL Lv   5 Current            4 Term 18 39w3d  3.317 kg (7 lb 5 oz) F Vag-Spont None Y DINA   3 Term 17 40w0d  3.946 kg (8 lb 11.2 oz) M Vag-Spont EPI N DINA   2 Term 10/04/15 41w5d  3.402 kg (7 lb 8 oz) M Vag-Spont EPI Y DINA   1 SAB 13 18w0d   F    ND       PSH:  Past Surgical History:   Procedure Laterality Date    ORIF, FRACTURE, TIBIAL TUBEROSITY Left        Family history:family history includes Heart attack in her maternal grandfather; Hypertension in her father; Miscarriages / Stillbirths in her sister.    Social history: reports that she quit smoking about 2 months ago. Her smoking use included cigarettes. She has never been exposed to tobacco smoke. She has quit using smokeless tobacco. She reports that she does not currently use alcohol. She reports that she does not currently use drugs after having used the following drugs: Marijuana.    Genetic history: The patient denies any inherited genetic diseases or birth defects in herself or her partner's personal history or family.    Objective:   /70 (BP Location: Left arm)   Pulse 97   Ht 5' 2" (1.575 m)   Wt 116.8 kg (257 lb 8 oz)   LMP  (LMP Unknown)   BMI " 47.10 kg/m²     Ultrasound performed. See viewpoint for full ultrasound report.    A varner living IUP is identified, and the biometry is appropriate for established gestational age.    Early, limited anatomy appears normal. The placenta is anterior.    Transvaginal cervical length was 3.9cm.     ASSESSMENT/PLAN:     32 y.o.  female with IUP at 16w3d     1. Prior poor obstetrical history in second trimester, antepartum  With her 1st pregnancy, she delivered at 18 weeks' gestation.  She reports that the baby was not growing properly and upon attending a routine OB visit, the baby no longer had a heartbeat.  She was sent to the OB unit for induction.  She denies any genetic testing being performed on the baby after delivery.  She is unsure if she had any APLS testing completed.  Of note, she had 3 successful pregnancies afterward.    With her 2nd pregnancy, she reports she went into labor at 20 weeks and a cerclage was placed at that time.  She was able to successfully carry to term.  In her 3rd and 4th pregnancies, she reports her physician monitoring her cervix and never needing cerclage placement.  She did not take progesterone for either pregnancy.  She delivered both of these pregnancies at term.    Recommendations:  -Serial cervical surveillance (scheduled through [facility] office)  -Without prior  delivery, progesterone not recommended at this time.  If a shortened cervix would be detected at any point, we will consider it at that time.        2. BMI affecting pregnancy in second trimester  The patient was counseled on the  risks associated with obesity which include and increased risk of hypertension, preeclampsia, gestational diabetes, venous thromboembolic disease,  delivery, macrosomia (with resultant shoulder dystocia, obstetric sphincter injury), IUFD, longer first stage of labor, decreased TOLAC success rate, emergent & scheduled  & complications of   (prolonged OR time, delayed delivery, excessive EBL, infection, wound separation/infection, higher spinal failure rate, more difficult intubation).  Obesity is also associated with fetal anomalies, specifically neural tube defects.  Studies have shown that the rate of complication increases with rising BMI and thus pregnancies with maternal morbid obesity are at increased risk.      BMI 47    Recommendations:  Discussed that TWG goal is 11-20 lbs  Screen for signs/symptoms of obstructive sleep apnea  Nutritionist consult offered (this is to be ordered by primary OB provider)  Consider early 1 hr GCT (between 14-16 weeks gestation); repeat at 24-28 weeks gestation  Start low dose aspirin 81 mg daily at 12-16 weeks for preeclampsia risk reduction  Targeted anatomical survey scheduled at 18-20 weeks  Fetal growth ultrasound at 32 weeks if BMI >= 40  Weekly  testing at 32 weeks if pre-pregnancy BMI >= 45  Lovenox 40 mg BID for VTE prophylaxis while admitted to the hospital (antepartum or postpartum) if BMI >= 40.   Encouraged breastfeeding  Postpartum lifestyle modifications & weight loss      3. Anxiety/depression/hx PP depression during pregnancy in second trimester  She reports a history of anxiety and depression. She is taking Zoloft 50mg daily. She reports improvement of symptoms with the utilization of this medication regimen. Discussed increased risk for peripartum and postpartum mood disorders. Precautions provided.      We discussed the usage of SSRIs in pregnancy and the minimal risks associated with the fetus.      We have discussed the importance of optimization of mental health conditions during pregnancy in an effort to reduce the risk of postpartum mood disorders. We have discussed options for management including psychotherapy, counseling, cognitive behavioral therapy, exercise/yoga, journaling, and psychiatry services. She will notify our office if she is interested in being referred for any of  the above.        FOLLOW UP:   F/u in 2 weeks for cx length only  F/u in 4 weeks for US/MFM visit    This consultation was completed with the assistance of Leanna Francis NP.      Lilly Cheatham MD  Maternal Fetal Medicine         Electronically Signed by Leanna Francis April 26, 2023

## 2023-04-26 NOTE — ASSESSMENT & PLAN NOTE
With her 1st pregnancy, she delivered at 18 weeks' gestation.  She reports that the baby was not growing properly and upon attending a routine OB visit, the baby no longer had a heartbeat.  She was sent to the OB unit for induction.  She denies any genetic testing being performed on the baby after delivery.  She is unsure if she had any APLS testing completed.  Of note, she had 3 successful pregnancies afterward.    With her 2nd pregnancy, she reports she went into labor at 20 weeks and a cerclage was placed at that time.  She was able to successfully carry to term.  In her 3rd and 4th pregnancies, she reports her physician monitoring her cervix and never needing cerclage placement.  She did not take progesterone for either pregnancy.  She delivered both of these pregnancies at term.    Recommendations:  -Serial cervical surveillance (scheduled through [facility] office)  -Without prior  delivery, progesterone not recommended at this time.  If a shortened cervix would be detected at any point, we will consider it at that time.

## 2023-04-26 NOTE — ASSESSMENT & PLAN NOTE
The patient was counseled on the  risks associated with obesity which include and increased risk of hypertension, preeclampsia, gestational diabetes, venous thromboembolic disease,  delivery, macrosomia (with resultant shoulder dystocia, obstetric sphincter injury), IUFD, longer first stage of labor, decreased TOLAC success rate, emergent & scheduled  & complications of  (prolonged OR time, delayed delivery, excessive EBL, infection, wound separation/infection, higher spinal failure rate, more difficult intubation).  Obesity is also associated with fetal anomalies, specifically neural tube defects.  Studies have shown that the rate of complication increases with rising BMI and thus pregnancies with maternal morbid obesity are at increased risk.      BMI 47    Recommendations:   Discussed that TWG goal is 11-20 lbs   Screen for signs/symptoms of obstructive sleep apnea   Nutritionist consult offered (this is to be ordered by primary OB provider)   Consider early 1 hr GCT (between 14-16 weeks gestation); repeat at 24-28 weeks gestation   Start low dose aspirin 81 mg daily at 12-16 weeks for preeclampsia risk reduction   Targeted anatomical survey scheduled at 18-20 weeks   Fetal growth ultrasound at 32 weeks if BMI >= 40   Weekly  testing at 32 weeks if pre-pregnancy BMI >= 45   Lovenox 40 mg BID for VTE prophylaxis while admitted to the hospital (antepartum or postpartum) if BMI >= 40.    Encouraged breastfeeding   Postpartum lifestyle modifications & weight loss

## 2023-04-26 NOTE — PROGRESS NOTES
[facility] OB OFFICE VISIT NOTE  Earlene Camilo  [id number]  2023      Chief Complaint: Routine Prenatal Visit (16 weeks pregnant and 3 days)    Earlene Camilo is a 32 y.o.  female 16w3d by 2nd trimester US (CHERELLE Estimated Date of Delivery: 10/8/23) presenting to [facility] for\ routine OB vist.     Acute Issues: Denies any acute issues. Taking PNV and began ASA 81 mg per MFM.     Chronic Issues:   Dizziness w/ imposed Pre-Syncope/Syncope: Has happened twice since last appt. Last approx 1 week ago. Felt faint and lost balance, but didn't black out. Denies any injuries or head trauma. Gets dizzy when going from sitting to standing quickly. Denies any CP, SOB, palpitations, tinnitus, ear pain, blurry vision, easy bruising, vaginal bleeding, dysuria, urinary urgency/frequency, polyuria, polydipsia, excessive fatigue, or  diarrhea.  + frequent nausea and occasional emesis throughout the day--> poor appetite, using Prevacid and PRN Zofran. Notes good PO liquid intake.  + Has hx of migraine HA and floaters in vision, but denies increased severity or frequency of symptoms or association w/ above event.     Floaters: Reports she was told she had papilledema that causes her floaters, was seen by Optho (doesn't remember name). Sees an optometrist regularly. Pt would like to try to establish w/ previous Optho physician and declines referral at this time.  Denies previous diagnosis of Pseudotumor Cerebri.     GERD: Using Prevacid as noted above, doesn't help w/ nausea/vomitting but denies sig acid reflux/heart burn. .  Doesn't eat small frequent meals or avoid spicy/acidic foods.     Depression/Anxiety: Taking Zolfot 50 mg qd, believes medication is helping. However, has occassional teary spells and irritability (primarily regarding discipling her children). Also notes ocassional depressed mood w/ anhedonia. Denies SI/HI/Self-Harm. Open to therapy referral,b ut doesn't want dose adjustment in Zoloft at this time.     OB  "Review of Systems:     - Fetal movements: Yes   - Vaginal bleeding: No  - Vaginal discharge: No  - Loss of fluid: No  - Contractions: No  - Headaches: Yes, frontal, mild to severe, 2-3 x a week. Doesn't take any medications 2/2 afraid of fetal side effects.    - Vision changes: Chronic floaters, a couple times a week. Denies any change in severity/frequency since pregnancy.   - Edema: intermittent     Blood pressure 107/72, pulse 88, temperature 98.1 °F (36.7 °C), temperature source Oral, resp. rate 18, height 5' 2" (1.575 m), weight 117 kg (258 lb), SpO2 97 %.     Physical Exam   General: in no acute distress. VS wnl. AF.   HEENT: PEERLA, EOMI, Visual Fields bilat intact w/o deficit. Pt wearing glasses.   RESP: clear to auscultation bilaterally, non labored  CV: regular rate and rhythm, no murmurs, no edema  ABD: gravid, nontender, BS+  FHTs: 140s bpm  Fundal height: Unable to ascertain 2/2 body habitus     Current Medications:   Current Outpatient Medications on File Prior to Visit   Medication Sig Dispense Refill    docusate sodium (COLACE) 100 MG capsule Take 1 capsule (100 mg total) by mouth 2 (two) times daily. (Patient not taking: Reported on 4/26/2023) 60 capsule 0    lansoprazole (PREVACID) 30 MG capsule Take 1 capsule (30 mg total) by mouth once daily. 30 capsule 11    sertraline (ZOLOFT) 50 MG tablet Take 0.5 tablets (25 mg total) by mouth every evening for 7 days, THEN 1 tablet (50 mg total) every evening. 90 tablet 0    [DISCONTINUED] ofloxacin (FLOXIN) 0.3 % otic solution Place 10 drops into the left ear once daily. (Patient not taking: Reported on 4/26/2023) 5 mL 0     No current facility-administered medications on file prior to visit.       Labs:    Initial OB Labs:  - Blood Type and Rh: Not done, ordered 4/26/23.   - Antibody Screen: Negative    - CBC H/H: 11.5/36.0   - HIV: NR   - Syphilis Ab:  NR   - GC:  Negative   - CT: Negative   - HBsAg: Negative   - HCVAb: Negative   - Rubella: Immune   - " Varicella: Immune  - UA & Culture:  NG   - Sickle Cell Screen: Negative   - PAP: NIL, HPV -   - Influenza vaccine date:N/A   - BTL desired: Yes.     15-20 Weeks Lab  - Quad Screen:     28 Week Lab  - 1H GTT:   - Rhogam:   - Date of Tdap:   - CBC H/H:   - Syphilis Ab:   - BTL consent:     36 Week Lab  - CBC H/H:   - Syphilis Ab:   - GBS Culture:   - HIV:   - Cervical GC:   - Cervical CT:     Imaging:   Initial US :   FINDINGS:  The uterus measures 13.9 x 6.9 x 9.4 cm ovaries were not clearly identified  Multiple transverse and sagittal gray scale sonographic images were acquired through the pelvis. There is a single fetus in the uterus in the indeterminate position with an observed heart rate of 155 BPM.  The crown rump length is 6.23 cm equivalent to 12 weeks and 4 days.  No other significant abnormalities are identified     Impression:  Single intrauterine pregnancy with a viable fetus with a fetal heart rate of 155 beats per minute.  Estimated age by ultrasound 12 weeks and 4 days +/-1 week 1 day.  Estimated date of delivery August 8, 2023 4/26/2023  Impression   =========   A varner living IUP is identified, and the biometry is appropriate for established gestational age. Early, limited anatomy appears normal. The placenta is anterior.   Transvaginal cervical length was 3.9cm.        Urine Dip (4/26/2023):     Component 11:28   Color, UA Dark Yellow    pH, UA 8.5    WBC, UA Negative    Nitrite, UA Negative    Protein, POC Trace    Glucose, UA Negative    Ketones, UA Negative    Urobilinogen, UA 0.2    Bilirubin, POC Negative    Blood, UA Negative    Clarity, UA Clear    Spec Grav UA 1.015        Assessment:   1. 16 weeks gestation of pregnancy    2. Pre-syncope    3. History of migraine    4. Floaters in visual field, bilateral    5. Vomiting or nausea of pregnancy    6. Anxiety and depression    7. History of cervical incompetence in pregnancy, currently pregnant    8. Obesity, Class III, BMI 40-49.9  (morbid obesity)        Plan:    16 WGA   - OB Protocol, PNVs, ASA 81 mg for pre-e risk reduction   - Urine dip reviewed as above  - Routine (initial) labs: reviewed, as above.   - Mother plans to breastfeed  - Postpartum contraception discussion: BTL desired, need to sign at/near 28 WGA visit.   - Labor and 2nd trimester of pregnancy precautions discussed in depth.     Pre-Syncope   -Orthostatic vitals negative in clinic, but pt BP is low normal. EKG done and WNL. NO abnormalities noted on exam. Ordered CMP, Mg, Phos, CBC, and TSH.   -Counseled pt on avoiding quick position changes  and rising slowly from sitting, since these seem to be triggers. Counseled on good hydration and small frequent meals. Gave 1st line tx of n/v of pregnancy and continue Prevacid to improve impediments to eating. Safety precautions given.     Headache Disorder   Visual Floaters   -Counseled on hydration, good PO intake, avoidance of known triggers. Can take Prn Tylenol for HA, be cautious of Tylenol overuse HA. Pt used to take Fioricet in past, can consider if Tylenol not effective.   -Pt with floaters and reports possible hx of papilledema. Recommend Optho f/u. Pt would like to f/u with her previous Optho via her Optometrist, expressed understanding. If pt hasn't established care by next appt, consider [facility] Optho referral.  RTC/ED precautions given for progressive symptoms.   -Gave Pre-Eclampsia precautions. Pt has BP cuff at home. Recommend to check BP throughout pregnancy if symptomatic and RTC/ED if > 140/90.     N/V of Pregnancy   GERD   -Continue Prevacid. Will add Vitamin B6 and Unisom to regimen. Counseled on diet/lifestyle modifications. Will CTM. RTC/ED precautions given .     Anxiety/Depression   -Pt w/ some depressive/anxiety symptoms, offered increase of Zoloft but pt doesn't desire at this time. Referral placed to Behavioral health for CBT. Pt w/ good family support via fiance and mother.  No SI/HI/Self harm at this time.  Gave RTC/ED precautions.     Hx of Cervical Insufficiency   -Had 2 deliveries s/p a delivery that required Cerclage Placement. Followed by [facility], saw today. F/u in 2 weeks for cx length and f/u in 4 weeks for US/MFM visit. Pt aware of appts.     Class III Obesity   -Pt following w/ MFM, see note for recommendations.     - Return to clinic in 2 weeks for routine follow-up to monitor above issues.     Hiral May MD  [employer]  Resident, -III

## 2023-04-27 NOTE — PROGRESS NOTES
Discussed with resident at time of encounter 04-26-23.  IUP @ 16 weeks.  Resident's note reviewed 04-27-23.  Agree with assessment; plan of care appropriate.  Professional services provided in an outpatient primary care center affiliated with a [facility] institution.

## 2023-05-01 LAB
# FETUSES: NORMAL
2ND TRIMESTER 4 SCREEN SERPL-IMP: NORMAL
AFP ADJ MOM SERPL: 1.09 MOM
AFP SERPL IA-MCNC: 26.5 NG/ML
AGE AT DELIVERY: NORMAL
B-HCG ADJ MOM SERPL: 1.45 MOM
CHORION TYPE: NORMAL
COLLECT DATE: NORMAL
CURRENT SMOKER: NORMAL
FET TS 21 RISK FROM MAT AGE: NORMAL
GA METHOD: NORMAL
GA US.COMPOSITE.EST: NORMAL WK,D
HCG SERPL IA-ACNC: 33.5 IU/ML
HX OF NTD QL: NO
HX OF NTD QL: NO
HX OF TRISOMY 21 QL: NO
IDDM PATIENT QL: NO
INHIBIN A ADJ MOM SERPL: 0.47 MOM
INHIBIN SERPL-MCNC: 52 PG/ML
IVF PREGNANCY: NO
LABORATORY COMMENT REPORT: NORMAL
M PHYSICIAN PHONE NUMBER: NORMAL
MATERNAL RISK FACTORS: NORMAL
NEURAL TUBE DEFECT RISK FETUS: NORMAL %
RECOM F/U: NORMAL
TEST PERFORMANCE INFO SPEC: NORMAL
TS 18 RISK FETUS: NORMAL
TS 21 RISK FETUS: NORMAL
U ESTRIOL ADJ MOM SERPL: 1.24 MOM
U ESTRIOL SERPL-MCNC: 0.98 NG/ML

## 2023-05-03 ENCOUNTER — PATIENT OUTREACH (OUTPATIENT)
Dept: FAMILY MEDICINE | Facility: CLINIC | Age: 33
End: 2023-05-03
Payer: MEDICAID

## 2023-05-03 DIAGNOSIS — O99.212 OBESITY AFFECTING PREGNANCY IN SECOND TRIMESTER: ICD-10-CM

## 2023-05-03 DIAGNOSIS — O99.342 MENTAL DISORDER DURING PREGNANCY IN SECOND TRIMESTER: ICD-10-CM

## 2023-05-03 DIAGNOSIS — O09.292 PRIOR POOR OBSTETRICAL HISTORY IN SECOND TRIMESTER, ANTEPARTUM: Primary | ICD-10-CM

## 2023-05-04 NOTE — PROGRESS NOTES
Follow-up: 6/1/2023    Appointment reminder: 5/22/2023        Patient verbalized understanding: Yes        Other comments:  Identity verified.  Pt states she had some abdominal pain recently due to the need for a BM.  She states she had a BM and the cramps resolved.  She states she is taking a stool softener BID.  Instructed to eat lots of fresh fruit vegetables and drink plenty of water.  Pt denies vaginal bleeding, vision changes.  She states she has a headache on occasion that is resolved with OTC medication.  Pt denies SI/HI.  Pt states she has felt some flutters from the baby moving.  Patient denies any SDOH barriers at this time. Pt had an OB appt  4/26/23 was given prescriptions for B6, Unisom, referral to behavioral health and RTC 2 weeks (5/9/2023 1000).  Pt had maternal fetal medicine ultrasound and office visit 4/26/2023.  She was started on ASA 81 mg daily, RTC 2 weeks for ultrasound (5/10/2023 1130) and office visit with ultrasound in 4 weeks 95/24/23 0900 ultrasound, 0930 office visit).  Instructed to present to the ED for vaginal bleeding, leaking fluid, abdominal pain, contractions, SOB, CP, edema of the extremities, severe HA with vision changes unresolved with medication and decreased fetal movement.  Stressed the importance of medication compliance, keeping appointments and reinforced the use of urgent care clinic for non emergent issues until PCP established.  Patient verbalized understanding to all instructions.                      Education given on: healthy diet, OB precautions, keeping appts, medication compliance

## 2023-05-09 ENCOUNTER — OFFICE VISIT (OUTPATIENT)
Dept: FAMILY MEDICINE | Facility: CLINIC | Age: 33
End: 2023-05-09
Payer: MEDICAID

## 2023-05-09 VITALS
HEIGHT: 62 IN | RESPIRATION RATE: 20 BRPM | SYSTOLIC BLOOD PRESSURE: 96 MMHG | OXYGEN SATURATION: 99 % | BODY MASS INDEX: 47.51 KG/M2 | DIASTOLIC BLOOD PRESSURE: 61 MMHG | HEART RATE: 87 BPM | WEIGHT: 258.19 LBS | TEMPERATURE: 98 F

## 2023-05-09 DIAGNOSIS — F41.9 ANXIETY AND DEPRESSION: ICD-10-CM

## 2023-05-09 DIAGNOSIS — N89.8 VAGINAL ITCHING: ICD-10-CM

## 2023-05-09 DIAGNOSIS — F32.A ANXIETY AND DEPRESSION: ICD-10-CM

## 2023-05-09 DIAGNOSIS — R55 PRE-SYNCOPE: ICD-10-CM

## 2023-05-09 DIAGNOSIS — Z3A.18 18 WEEKS GESTATION OF PREGNANCY: Primary | ICD-10-CM

## 2023-05-09 DIAGNOSIS — R10.2 VAGINAL PAIN: ICD-10-CM

## 2023-05-09 DIAGNOSIS — H43.393 FLOATERS IN VISUAL FIELD, BILATERAL: ICD-10-CM

## 2023-05-09 DIAGNOSIS — O21.9 VOMITING OR NAUSEA OF PREGNANCY: ICD-10-CM

## 2023-05-09 LAB
BILIRUB SERPL-MCNC: NORMAL MG/DL
BLOOD URINE, POC: NORMAL
C TRACH DNA SPEC QL NAA+PROBE: NOT DETECTED
CLARITY, POC UA: NORMAL
CLUE CELLS VAG QL WET PREP: ABNORMAL
COLOR, POC UA: YELLOW
GLUCOSE UR QL STRIP: NORMAL
KETONES UR QL STRIP: NORMAL
LEUKOCYTE ESTERASE URINE, POC: NORMAL
N GONORRHOEA DNA SPEC QL NAA+PROBE: NOT DETECTED
NITRITE, POC UA: NORMAL
PH, POC UA: 8.5
PROTEIN, POC: NORMAL
SPECIFIC GRAVITY, POC UA: 1.02
T VAGINALIS VAG QL WET PREP: ABNORMAL
UROBILINOGEN, POC UA: 0.2
WBC #/AREA VAG WET PREP: ABNORMAL
YEAST SPEC QL WET PREP: ABNORMAL

## 2023-05-09 PROCEDURE — 87255 GENET VIRUS ISOLATE HSV: CPT | Mod: 90

## 2023-05-09 PROCEDURE — 99214 OFFICE O/P EST MOD 30 MIN: CPT | Mod: PBBFAC

## 2023-05-09 PROCEDURE — 81002 URINALYSIS NONAUTO W/O SCOPE: CPT | Mod: PBBFAC

## 2023-05-09 PROCEDURE — 87210 SMEAR WET MOUNT SALINE/INK: CPT

## 2023-05-09 PROCEDURE — 87591 N.GONORRHOEAE DNA AMP PROB: CPT

## 2023-05-09 NOTE — PROGRESS NOTES
[facility] OB OFFICE VISIT NOTE  Earlene Camilo  [id number]  2023      Chief Complaint: Follow-up (18 week o.b states vaginal itching ,no vag odor or bleeding)    Earlene Camilo is a 32 y.o.  female 18w2d by 2nd trimester US (CHERELLE Estimated Date of Delivery: 10/8/23) presenting to [facility] for\ routine OB vist.     Acute Issues: vaginal itching started couple days ago, clear with occasional yellow color, starting to hurt, but not with urinating; more with sitting down  - still having sex, last intercourse ; sxs started before that  - CT in the past, s/p tx 15yrs ago    . Taking PNV and continuing ASA 81 mg per MFM.     Chronic Issues:   Dizziness w/ imposed Pre-Syncope/Syncope: Still having eps daily, particularly when raising voice. Maintain hydration - drinks 20oz bottle x8-9 daily; recently craving black pepper, h/o KWAKU in prev pregnancy. Poor appetite noted from last visit, glucose noted to be low at 69 on CMP last visit. Sxs improve with orange juice. Has glucometer at home but unsure how to use.    Floaters: Reports she was told she had papilledema that causes her floaters, was seen by Optho (doesn't remember name). Sees an optometrist regularly. Pt attempted to call prev ophtho and currently waiting to hear back    GERD: Using Prevacid, vit b6, doxalymine     Depression/Anxiety: Taking Zolfot 50 mg qd, believes medication is helping. Open to therapy referral,b ut doesn't want dose adjustment in Zoloft at this time.     OB Review of Systems:   - Fetal movements: not really, have felt a few times   - Vaginal bleeding: No  - Vaginal discharge: yes see above  - Loss of fluid: No  - Contractions: No, stretching pain  - Headaches: Yes, frontal, mild to severe, 2-3 x a week. Doesn't take any medications 2/2 afraid of fetal side effects.  Resolves with rest  - Vision changes: Chronic floaters, a couple times a week. Denies any change in severity/frequency since pregnancy.   - Edema: intermittent,  "improves with elevation    Blood pressure 96/61, pulse 87, temperature 98.1 °F (36.7 °C), temperature source Oral, resp. rate 20, height 5' 2" (1.575 m), weight 117.1 kg (258 lb 3.2 oz), SpO2 99 %.     Physical Exam   General: in no acute distress. VS wnl. AF.   HEENT: PEERLA, EOMI, Visual Fields bilat intact w/o deficit. Pt wearing glasses.   RESP: clear to auscultation bilaterally, non labored  CV: regular rate and rhythm, no murmurs, no edema  ABD: gravid, nontender, BS+  FHTs: 147-148s bpm  Fundal height: approx 18, accuracy difficult 2/2 body habitus  : sore noted to L side of clitoral region, white milky discharge in vaginal vault, no blood noted, cervical os difficult to visualize    Current Medications:   Current Outpatient Medications on File Prior to Visit   Medication Sig Dispense Refill    doxylamine succinate (UNISOM, DOXYLAMINE,) 25 mg tablet Take 1 tablet (25 mg total) by mouth every evening. Medication is sedating. 30 tablet 0    fluticasone propionate (FLONASE) 50 mcg/actuation nasal spray fluticasone propionate Take 2 Spray(s) (nasal) 1 time per day for 30 days 2 sprays each nostril once daily. 20220516 spray,suspension 1 time per day nasal 30 days active 50 mcg/actuation      lansoprazole (PREVACID) 30 MG capsule Take 1 capsule (30 mg total) by mouth once daily. 30 capsule 11    ondansetron (ZOFRAN) 8 MG tablet Take 8 mg by mouth 3 (three) times daily.      PRENATAL VITAMIN 27 mg iron- 0.8 mg Tab Take 1 tablet by mouth.      pyridoxine, vitamin B6, (B-6) 25 MG Tab Take 1 tablet (25 mg total) by mouth 3 (three) times daily. 90 tablet 0    sertraline (ZOLOFT) 50 MG tablet Take 0.5 tablets (25 mg total) by mouth every evening for 7 days, THEN 1 tablet (50 mg total) every evening. 90 tablet 0     No current facility-administered medications on file prior to visit.     Labs:    Initial OB Labs:  - Blood Type and Rh: Not done, ordered 4/26/23.   - Antibody Screen: Negative    - CBC H/H: 11.5/36.0   - " HIV: NR   - Syphilis Ab:  NR   - GC:  Negative   - CT: Negative   - HBsAg: Negative   - HCVAb: Negative   - Rubella: Immune   - Varicella: Immune  - UA & Culture:  NG   - Sickle Cell Screen: Negative   - PAP: NIL, HPV -   - Influenza vaccine date:N/A   - BTL desired: Yes.     15-20 Weeks Lab  - Quad Screen: normal risk    28 Week Lab  - 1H GTT:   - Rhogam:   - Date of Tdap:   - CBC H/H:   - Syphilis Ab:   - BTL consent:     36 Week Lab  - CBC H/H:   - Syphilis Ab:   - GBS Culture:   - HIV:   - Cervical GC:   - Cervical CT:     Imaging:   Initial US :   FINDINGS:  The uterus measures 13.9 x 6.9 x 9.4 cm ovaries were not clearly identified  Multiple transverse and sagittal gray scale sonographic images were acquired through the pelvis. There is a single fetus in the uterus in the indeterminate position with an observed heart rate of 155 BPM.  The crown rump length is 6.23 cm equivalent to 12 weeks and 4 days.  No other significant abnormalities are identified     Impression:  Single intrauterine pregnancy with a viable fetus with a fetal heart rate of 155 beats per minute.  Estimated age by ultrasound 12 weeks and 4 days +/-1 week 1 day.  Estimated date of delivery August 8, 2023 4/26/2023  Impression   =========   A varner living IUP is identified, and the biometry is appropriate for established gestational age. Early, limited anatomy appears normal. The placenta is anterior.   Transvaginal cervical length was 3.9cm.      Assessment:   1. 18 weeks gestation of pregnancy    2. Vaginal itching    3. Vaginal pain    4. Floaters in visual field, bilateral    5. Pre-syncope    6. Anxiety and depression    7. Vomiting or nausea of pregnancy        Plan:    18 WGA   - OB Protocol, PNVs, ASA 81 mg for pre-e risk reduction   - Urine dip reviewed as above  - Routine (initial) labs: reviewed, as above.   - Mother plans to breastfeed  - Postpartum contraception discussion: BTL desired, need to sign at/near 28 WGA visit.    - Labor and 2nd trimester of pregnancy precautions discussed in depth.     Vaginal itch/pain  - lesion as exam above  - HSV swab obtained - no known h/o herpes in the past; same partner for 15yrs  - RPR, GC/CT, and wet prep also obtained    Pre-Syncope   -CMP, Mg, Phos, CBC, and TSH obtained last visit - reviewed, wnl with exception of glucose being 69; pt improves with orange juice at home; hypoglycemia may be contributing to sxs; encouraged to maintain po intake, nausea and vomiting may be contributing  - hydration appears adequate per hx  - went over how to check CBG at home and use glucometer - pt has supplies at home    Headache Disorder   Visual Floaters   -Counseled on hydration, good PO intake, avoidance of known triggers. Pt avoiding tylenol due to concerns of ADHD with tylenol during pregnancy and already have 2 kids with ADHD at home.  -Pt with floaters and reports possible hx of papilledema. Pt has reached out for appt but no callback from prev ophtho yet.  RTC/ED precautions given for progressive symptoms.   -Gave Pre-Eclampsia precautions. Pt has BP cuff at home. Recommend to check BP throughout pregnancy if symptomatic and RTC/ED if > 140/90.     N/V of Pregnancy   GERD   -Continue Prevacid. Vitamin B6 and Unisom added to regimen; improving Counseled on diet/lifestyle modifications. Cont to moniotr. RTC/ED precautions given.     Anxiety/Depression   -Pt w/ some depressive/anxiety symptoms, pt happy with current dose. Will cont at this time    Hx of Cervical Insufficiency   -Had 2 deliveries s/p a delivery that required Cerclage Placement. Followed by [facility] MFM - rec'd against tx with progesterone at this time, but continuing cervical length surveillance with serial US.     Class III Obesity   -Pt following w/ MFM, see note for recommendations.     - Return to clinic in 2 weeks - will sent to high risk for evaluation given current MFM following

## 2023-05-10 ENCOUNTER — PROCEDURE VISIT (OUTPATIENT)
Dept: MATERNAL FETAL MEDICINE | Facility: CLINIC | Age: 33
End: 2023-05-10
Payer: MEDICAID

## 2023-05-10 DIAGNOSIS — O99.342 MENTAL DISORDER DURING PREGNANCY IN SECOND TRIMESTER: ICD-10-CM

## 2023-05-10 DIAGNOSIS — O09.292 PRIOR POOR OBSTETRICAL HISTORY IN SECOND TRIMESTER, ANTEPARTUM: ICD-10-CM

## 2023-05-10 DIAGNOSIS — O99.212 OBESITY AFFECTING PREGNANCY IN SECOND TRIMESTER: ICD-10-CM

## 2023-05-10 PROCEDURE — 76817 TRANSVAGINAL US OBSTETRIC: CPT | Mod: S$GLB,,, | Performed by: OBSTETRICS & GYNECOLOGY

## 2023-05-10 PROCEDURE — 76817 US MFM PROCEDURE (VIEWPOINT): ICD-10-PCS | Mod: S$GLB,,, | Performed by: OBSTETRICS & GYNECOLOGY

## 2023-05-11 ENCOUNTER — PATIENT OUTREACH (OUTPATIENT)
Dept: FAMILY MEDICINE | Facility: CLINIC | Age: 33
End: 2023-05-11
Payer: MEDICAID

## 2023-05-11 LAB — AR CULTURE, HSV FINAL: NORMAL

## 2023-05-12 NOTE — PROGRESS NOTES
Discussed with resident at time of encounter.  IUP.  Resident's note reviewed.  Agree with assessment; plan of care appropriate.  Professional services provided in an outpatient primary care center affiliated with a teaching institution.

## 2023-05-12 NOTE — PROGRESS NOTES
Called pt with name and  verified to discuss lab results. Neg HSV, wet prep (few WBCs), and GC/CT. RPR also neg. Pt reports persistent pain to area with some mild increase in intensity when touched. States she does shave, however, feels like a sore/ulcer more than an abrasion. Offered pt re-swab since pt is worsening in event we were early in course and did not get adequate specimen. Pt states she will be in [place] on Tuesday and would like to be seen then.  messaged regarding appt

## 2023-05-15 ENCOUNTER — PATIENT OUTREACH (OUTPATIENT)
Dept: FAMILY MEDICINE | Facility: CLINIC | Age: 33
End: 2023-05-15
Payer: MEDICAID

## 2023-05-15 NOTE — PROGRESS NOTES
Appointment Reminder 5/22/2023  Follow-up: 6/1/2023    Appointment reminder given for [facility] OB 5/16/2023 1040.  Instructed to ask to schedule  a behavioral health appointment .        Patient verbalized understanding. Yes        Other comments:                    Education given on

## 2023-05-16 ENCOUNTER — OFFICE VISIT (OUTPATIENT)
Dept: FAMILY MEDICINE | Facility: CLINIC | Age: 33
End: 2023-05-16
Payer: MEDICAID

## 2023-05-16 VITALS
HEART RATE: 101 BPM | RESPIRATION RATE: 18 BRPM | TEMPERATURE: 98 F | SYSTOLIC BLOOD PRESSURE: 103 MMHG | HEIGHT: 62 IN | BODY MASS INDEX: 47.23 KG/M2 | OXYGEN SATURATION: 97 % | DIASTOLIC BLOOD PRESSURE: 70 MMHG | WEIGHT: 256.63 LBS

## 2023-05-16 DIAGNOSIS — B00.9 HERPES SIMPLEX INFECTION IN MOTHER DURING SECOND TRIMESTER OF PREGNANCY: ICD-10-CM

## 2023-05-16 DIAGNOSIS — O98.512 HERPES SIMPLEX INFECTION IN MOTHER DURING SECOND TRIMESTER OF PREGNANCY: ICD-10-CM

## 2023-05-16 DIAGNOSIS — Z34.90 PREGNANCY, UNSPECIFIED GESTATIONAL AGE: Primary | ICD-10-CM

## 2023-05-16 LAB
BILIRUB SERPL-MCNC: NEGATIVE MG/DL
BLOOD URINE, POC: NEGATIVE
CLARITY, POC UA: CLEAR
COLOR, POC UA: YELLOW
GLUCOSE UR QL STRIP: NEGATIVE
KETONES UR QL STRIP: NEGATIVE
LEUKOCYTE ESTERASE URINE, POC: NORMAL
NITRITE, POC UA: NEGATIVE
PH, POC UA: 7
PROTEIN, POC: NORMAL
SPECIFIC GRAVITY, POC UA: 1.02
UROBILINOGEN, POC UA: NORMAL

## 2023-05-16 PROCEDURE — 99214 OFFICE O/P EST MOD 30 MIN: CPT | Mod: PBBFAC

## 2023-05-16 PROCEDURE — 81002 URINALYSIS NONAUTO W/O SCOPE: CPT | Mod: PBBFAC

## 2023-05-16 RX ORDER — VALACYCLOVIR HYDROCHLORIDE 1 G/1
1000 TABLET, FILM COATED ORAL EVERY 12 HOURS
Qty: 20 TABLET | Refills: 0 | Status: SHIPPED | OUTPATIENT
Start: 2023-05-16 | End: 2023-05-24
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
## 2023-05-22 ENCOUNTER — PATIENT OUTREACH (OUTPATIENT)
Dept: FAMILY MEDICINE | Facility: CLINIC | Age: 33
End: 2023-05-22
Payer: MEDICAID

## 2023-05-22 NOTE — PROGRESS NOTES
Follow-up: 6/1/2023    Appointment reminder given for 0934 Sent text message to remind patient of appointment with Maternal Fetal Medicine 5/24/2023 0900 for ultrasound and 0930 for office visit.  Address and phone number provided.    0935 Patient confirmed receipt of text.     Appointment Reminder 5/24/2023  Follow up 6/1/2023        Patient verbalized understanding.        Other comments:                    Education given on

## 2023-05-22 NOTE — PROGRESS NOTES
Faculty addendum: Patient discussed with resident. Chart was reviewed including vitals, labs, etc. Care provided reasonable and necessary. I participated in the management of the patient and was immediately available throughout the encounter. Services were furnished in a primary care center located in the outpatient department of a [facility] hospital. I agree with the resident's findings and plan as documented in the resident's note.

## 2023-05-24 ENCOUNTER — OFFICE VISIT (OUTPATIENT)
Dept: MATERNAL FETAL MEDICINE | Facility: CLINIC | Age: 33
End: 2023-05-24
Payer: MEDICAID

## 2023-05-24 ENCOUNTER — PATIENT OUTREACH (OUTPATIENT)
Dept: FAMILY MEDICINE | Facility: CLINIC | Age: 33
End: 2023-05-24
Payer: MEDICAID

## 2023-05-24 ENCOUNTER — PROCEDURE VISIT (OUTPATIENT)
Dept: MATERNAL FETAL MEDICINE | Facility: CLINIC | Age: 33
End: 2023-05-24
Payer: MEDICAID

## 2023-05-24 VITALS
WEIGHT: 257.81 LBS | HEART RATE: 104 BPM | BODY MASS INDEX: 47.16 KG/M2 | DIASTOLIC BLOOD PRESSURE: 54 MMHG | SYSTOLIC BLOOD PRESSURE: 115 MMHG

## 2023-05-24 DIAGNOSIS — O99.212 OBESITY AFFECTING PREGNANCY IN SECOND TRIMESTER: ICD-10-CM

## 2023-05-24 DIAGNOSIS — O09.292 PRIOR POOR OBSTETRICAL HISTORY IN SECOND TRIMESTER, ANTEPARTUM: ICD-10-CM

## 2023-05-24 DIAGNOSIS — O99.342 MENTAL DISORDER DURING PREGNANCY IN SECOND TRIMESTER: ICD-10-CM

## 2023-05-24 PROCEDURE — 1160F PR REVIEW ALL MEDS BY PRESCRIBER/CLIN PHARMACIST DOCUMENTED: ICD-10-PCS | Mod: CPTII,S$GLB,, | Performed by: OBSTETRICS & GYNECOLOGY

## 2023-05-24 PROCEDURE — 76811 OB US DETAILED SNGL FETUS: CPT | Mod: 26,S$GLB,, | Performed by: OBSTETRICS & GYNECOLOGY

## 2023-05-24 PROCEDURE — 3074F SYST BP LT 130 MM HG: CPT | Mod: CPTII,S$GLB,, | Performed by: OBSTETRICS & GYNECOLOGY

## 2023-05-24 PROCEDURE — 99214 OFFICE O/P EST MOD 30 MIN: CPT | Mod: TH,25,S$GLB, | Performed by: OBSTETRICS & GYNECOLOGY

## 2023-05-24 PROCEDURE — 1159F MED LIST DOCD IN RCRD: CPT | Mod: CPTII,S$GLB,, | Performed by: OBSTETRICS & GYNECOLOGY

## 2023-05-24 PROCEDURE — 3078F PR MOST RECENT DIASTOLIC BLOOD PRESSURE < 80 MM HG: ICD-10-PCS | Mod: CPTII,S$GLB,, | Performed by: OBSTETRICS & GYNECOLOGY

## 2023-05-24 PROCEDURE — 76817 PR US, OB, TRANSVAG APPROACH: ICD-10-PCS | Mod: 26,S$GLB,, | Performed by: OBSTETRICS & GYNECOLOGY

## 2023-05-24 PROCEDURE — 3074F PR MOST RECENT SYSTOLIC BLOOD PRESSURE < 130 MM HG: ICD-10-PCS | Mod: CPTII,S$GLB,, | Performed by: OBSTETRICS & GYNECOLOGY

## 2023-05-24 PROCEDURE — 1159F PR MEDICATION LIST DOCUMENTED IN MEDICAL RECORD: ICD-10-PCS | Mod: CPTII,S$GLB,, | Performed by: OBSTETRICS & GYNECOLOGY

## 2023-05-24 PROCEDURE — 3008F BODY MASS INDEX DOCD: CPT | Mod: CPTII,S$GLB,, | Performed by: OBSTETRICS & GYNECOLOGY

## 2023-05-24 PROCEDURE — 76817 TRANSVAGINAL US OBSTETRIC: CPT | Mod: 26,S$GLB,, | Performed by: OBSTETRICS & GYNECOLOGY

## 2023-05-24 PROCEDURE — 76811 PR US, OB FETAL EVAL & EXAM, TRANSABDOM,FIRST GESTATION: ICD-10-PCS | Mod: 26,S$GLB,, | Performed by: OBSTETRICS & GYNECOLOGY

## 2023-05-24 PROCEDURE — 3078F DIAST BP <80 MM HG: CPT | Mod: CPTII,S$GLB,, | Performed by: OBSTETRICS & GYNECOLOGY

## 2023-05-24 PROCEDURE — 3008F PR BODY MASS INDEX (BMI) DOCUMENTED: ICD-10-PCS | Mod: CPTII,S$GLB,, | Performed by: OBSTETRICS & GYNECOLOGY

## 2023-05-24 PROCEDURE — 99214 PR OFFICE/OUTPT VISIT, EST, LEVL IV, 30-39 MIN: ICD-10-PCS | Mod: TH,25,S$GLB, | Performed by: OBSTETRICS & GYNECOLOGY

## 2023-05-24 PROCEDURE — 1160F RVW MEDS BY RX/DR IN RCRD: CPT | Mod: CPTII,S$GLB,, | Performed by: OBSTETRICS & GYNECOLOGY

## 2023-05-24 NOTE — PROGRESS NOTES
MATERNAL-FETAL MEDICINE   FOLLOW UP NOTE    SUBJECTIVE:     Ms. Earlene Camilo is a 33 y.o.  female with IUP at 20w3d who is seen in consultation by MFM for evaluation and management of:  Problem   1. Prior poor obstetrical history in second trimester, antepartum   2. BMI affecting pregnancy in second trimester   3. Anxiety/depression/hx PP depression during pregnancy in second trimester       She is feeling well and has no current complaints. No LOF, VB, ctx. +FM.      Changes since last M visit:  No changes    Objective:   BP (!) 115/54 (BP Location: Right arm)   Pulse 104   Wt 117 kg (257 lb 13.3 oz)   LMP  (LMP Unknown)   BMI 47.16 kg/m²     Ultrasound performed. See viewpoint for full ultrasound report.    A detailed fetal anatomic ultrasound examination was performed for the following high risk indication: Obesity.   No fetal structural malformations are identified; however, fetal imaging is incomplete today for profile, philtrum, 4ch, outflow tracts, 3vt, ductal arch, and diaphragm.  A follow-up study will be scheduled to complete the fetal anatomic survey.   Fetal size today is consistent with established gestational age.   Transvaginal cervical length measures 4.2 cm.  Placental location is anterior without evidence of previa.     ASSESSMENT/PLAN:     33 y.o.  female with IUP at 16w3d     1. Prior poor obstetrical history in second trimester, antepartum  With her 1st pregnancy, she delivered at 18 weeks' gestation.  She reports that the baby was not growing properly and upon attending a routine OB visit, the baby no longer had a heartbeat.  She was sent to the OB unit for induction.  She denies any genetic testing being performed on the baby after delivery.  She is unsure if she had any APLS testing completed.  Of note, she had 3 successful pregnancies afterward.    With her 2nd pregnancy, she reports she went into labor at 20 weeks and a cerclage was placed at that time.  She was able  to successfully carry to term.  In her 3rd and 4th pregnancies, she reports her physician monitoring her cervix and never needing cerclage placement.  She did not take progesterone for either pregnancy.  She delivered both of these pregnancies at term.    Cervical length ultrasounds have been reassuring.    Recommendations:  -Serial cervical surveillance (scheduled through [facility] office- one more visit in 2 weeks)  -Without prior  delivery, progesterone not recommended at this time.  If a shortened cervix would be detected at any point, we will consider it at that time.        2. BMI affecting pregnancy in second trimester  The patient was counseled on the  risks associated with obesity which include and increased risk of hypertension, preeclampsia, gestational diabetes, venous thromboembolic disease,  delivery, macrosomia (with resultant shoulder dystocia, obstetric sphincter injury), IUFD, longer first stage of labor, decreased TOLAC success rate, emergent & scheduled  & complications of  (prolonged OR time, delayed delivery, excessive EBL, infection, wound separation/infection, higher spinal failure rate, more difficult intubation).  Obesity is also associated with fetal anomalies, specifically neural tube defects.  Studies have shown that the rate of complication increases with rising BMI and thus pregnancies with maternal morbid obesity are at increased risk.      BMI 47    Recommendations:  Discussed that TWG goal is 11-20 lbs  Screen for signs/symptoms of obstructive sleep apnea  Nutritionist consult offered (this is to be ordered by primary OB provider)  Consider early 1 hr GCT (between 14-16 weeks gestation); repeat at 24-28 weeks gestation  Start low dose aspirin 81 mg daily at 12-16 weeks for preeclampsia risk reduction  Targeted anatomical survey scheduled at 18-20 weeks  Fetal growth ultrasound at 32 weeks if BMI >= 40  Weekly  testing at 32 weeks if  pre-pregnancy BMI >= 45  Lovenox 40 mg BID for VTE prophylaxis while admitted to the hospital (antepartum or postpartum) if BMI >= 40.   Encouraged breastfeeding  Postpartum lifestyle modifications & weight loss      3. Anxiety/depression/hx PP depression during pregnancy in second trimester  She reports a history of anxiety and depression. She is taking Zoloft 50mg daily. She reports improvement of symptoms with the utilization of this medication regimen. Discussed increased risk for peripartum and postpartum mood disorders. Precautions provided.      We discussed the usage of SSRIs in pregnancy and the minimal risks associated with the fetus.      We have discussed the importance of optimization of mental health conditions during pregnancy in an effort to reduce the risk of postpartum mood disorders. We have discussed options for management including psychotherapy, counseling, cognitive behavioral therapy, exercise/yoga, journaling, and psychiatry services. She will notify our office if she is interested in being referred for any of the above.        FOLLOW UP:   F/u in 2 weeks for cx length/ Complete views  F/u in 12 weeks for US/MFM visit (growth)    This consultation was completed with the assistance of Leanna Francis NP.      Lilly Cheatham MD  Maternal Fetal Medicine         Electronically Signed by Lilly Cheatham May 24, 2023

## 2023-05-24 NOTE — PROGRESS NOTES
Follow-up: 6/1/2023    Appointment reminder given for [facility] OB         Patient verbalized understanding. Yes        Other comments:  Identity verified.  Reminded patient of appointment with [facility] OB 5/25/2023 0915.  Patient verbalized understanding and denies abdominal pain, vaginal bleeding, but endorses fetal movement.   .                     Education given on

## 2023-05-24 NOTE — ASSESSMENT & PLAN NOTE
With her 1st pregnancy, she delivered at 18 weeks' gestation.  She reports that the baby was not growing properly and upon attending a routine OB visit, the baby no longer had a heartbeat.  She was sent to the OB unit for induction.  She denies any genetic testing being performed on the baby after delivery.  She is unsure if she had any APLS testing completed.  Of note, she had 3 successful pregnancies afterward.    With her 2nd pregnancy, she reports she went into labor at 20 weeks and a cerclage was placed at that time.  She was able to successfully carry to term.  In her 3rd and 4th pregnancies, she reports her physician monitoring her cervix and never needing cerclage placement.  She did not take progesterone for either pregnancy.  She delivered both of these pregnancies at term.    Cervical length ultrasounds have been reassuring.    Recommendations:  -Serial cervical surveillance (scheduled through [facility] office- one more visit in 2 weeks)  -Without prior  delivery, progesterone not recommended at this time.  If a shortened cervix would be detected at any point, we will consider it at that time.

## 2023-05-25 ENCOUNTER — OFFICE VISIT (OUTPATIENT)
Dept: FAMILY MEDICINE | Facility: CLINIC | Age: 33
End: 2023-05-25
Payer: MEDICAID

## 2023-05-25 VITALS
HEART RATE: 89 BPM | OXYGEN SATURATION: 99 % | WEIGHT: 259.19 LBS | HEIGHT: 62 IN | DIASTOLIC BLOOD PRESSURE: 70 MMHG | SYSTOLIC BLOOD PRESSURE: 107 MMHG | BODY MASS INDEX: 47.7 KG/M2 | RESPIRATION RATE: 20 BRPM | TEMPERATURE: 98 F

## 2023-05-25 DIAGNOSIS — Z3A.20 20 WEEKS GESTATION OF PREGNANCY: Primary | ICD-10-CM

## 2023-05-25 PROBLEM — K21.9 GERD (GASTROESOPHAGEAL REFLUX DISEASE): Status: ACTIVE | Noted: 2023-05-25

## 2023-05-25 PROBLEM — J30.9 ALLERGIC RHINITIS: Status: ACTIVE | Noted: 2023-05-25

## 2023-05-25 LAB
BILIRUB SERPL-MCNC: NORMAL MG/DL
BLOOD URINE, POC: NORMAL
CLARITY, POC UA: NORMAL
COLOR, POC UA: YELLOW
GLUCOSE UR QL STRIP: NORMAL
KETONES UR QL STRIP: NORMAL
LEUKOCYTE ESTERASE URINE, POC: NORMAL
NITRITE, POC UA: NORMAL
PH, POC UA: 7
PROTEIN, POC: NORMAL
SPECIFIC GRAVITY, POC UA: 1.01
UROBILINOGEN, POC UA: 0.2

## 2023-05-25 PROCEDURE — 81002 URINALYSIS NONAUTO W/O SCOPE: CPT | Mod: PBBFAC

## 2023-05-25 PROCEDURE — 99214 OFFICE O/P EST MOD 30 MIN: CPT | Mod: PBBFAC
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
## 2023-06-01 ENCOUNTER — PATIENT OUTREACH (OUTPATIENT)
Dept: FAMILY MEDICINE | Facility: CLINIC | Age: 33
End: 2023-06-01
Payer: MEDICAID

## 2023-06-01 NOTE — PROGRESS NOTES
Follow-up:  6/30/2023    Appointment reminder: 6/9/2023        Patient verbalized understanding: Yes        Other comments: Identity verified.  Pt denies abdominal pain, vaginal bleeding, CP, SOB, severe HA, vision changes, but endorses mild lower extremity edema and positive fetal movement.  Pt had OB appt 5/6/23 for vaginal itching, 5/16/23 treated for HSV with Valcyclovir for 10 days, 5/25/23 CPM, RTC 4 weeks for 1 hour GTT.  Pt had maternal fetal medicine ultrasound and office visit 5/24/2023, RTC 2 weeks for ultrasound.  Patient denies any SDOH barriers at this time.  Stressed the importance of medication compliance, keeping appointments and reinforced the use of urgent care clinic for non emergent issues unrelated to pregnancy until PCP established.  Patient verbalized understanding to all instructions.                     Education given on:  Labor precautions, medication compliance, keeping appointments.

## 2023-06-04 NOTE — PROGRESS NOTES
I have personally reviewed the review of systems (ROS) and past, family and social histories (PFSH) documented above by the resident.  I have reviewed the care furnished by the resident during the encounter, including a review of the patient's medical history, the resident's findings on physical examination, diagnosis, and the treatment plan.  I participated in the management of the patient and was immediately available throughout the encounter.   I was physically present during all key portions of the service(s) provided with the resident.  Services were furnished in a primary care center located in the outpatient department of a [facility].

## 2023-06-07 DIAGNOSIS — O99.212 OBESITY AFFECTING PREGNANCY IN SECOND TRIMESTER: ICD-10-CM

## 2023-06-07 DIAGNOSIS — O99.342 MENTAL DISORDER DURING PREGNANCY IN SECOND TRIMESTER: ICD-10-CM

## 2023-06-07 DIAGNOSIS — O09.292 PRIOR POOR OBSTETRICAL HISTORY IN SECOND TRIMESTER, ANTEPARTUM: Primary | ICD-10-CM

## 2023-06-09 ENCOUNTER — PATIENT OUTREACH (OUTPATIENT)
Dept: FAMILY MEDICINE | Facility: CLINIC | Age: 33
End: 2023-06-09
Payer: MEDICAID

## 2023-06-09 NOTE — PROGRESS NOTES
Follow-up: 6/30/2023    Appointment reminder given for OB Ultrasound at [facility] 6/12/2023 1130.        Patient verbalized understanding: Yes        Other comments: Identity verified.  Reminded patient of appointment with [facility] for an OB ultrasound 6/12/2023 1130.   Patient verbalized understanding.   Pt denies abdominal pain, vaginal bleeding, endorses positive fetal movement.                    Education given on

## 2023-06-12 ENCOUNTER — PROCEDURE VISIT (OUTPATIENT)
Dept: MATERNAL FETAL MEDICINE | Facility: CLINIC | Age: 33
End: 2023-06-12
Payer: MEDICAID

## 2023-06-12 DIAGNOSIS — O99.212 OBESITY AFFECTING PREGNANCY IN SECOND TRIMESTER: ICD-10-CM

## 2023-06-12 DIAGNOSIS — O99.342 MENTAL DISORDER DURING PREGNANCY IN SECOND TRIMESTER: ICD-10-CM

## 2023-06-12 DIAGNOSIS — O09.292 PRIOR POOR OBSTETRICAL HISTORY IN SECOND TRIMESTER, ANTEPARTUM: ICD-10-CM

## 2023-06-12 PROCEDURE — 76817 TRANSVAGINAL US OBSTETRIC: CPT | Mod: S$GLB,,, | Performed by: OBSTETRICS & GYNECOLOGY

## 2023-06-12 PROCEDURE — 76816 OB US FOLLOW-UP PER FETUS: CPT | Mod: S$GLB,,, | Performed by: OBSTETRICS & GYNECOLOGY

## 2023-06-12 PROCEDURE — 76816 US MFM PROCEDURE (VIEWPOINT): ICD-10-PCS | Mod: S$GLB,,, | Performed by: OBSTETRICS & GYNECOLOGY

## 2023-06-12 PROCEDURE — 76817 US MFM PROCEDURE (VIEWPOINT): ICD-10-PCS | Mod: S$GLB,,, | Performed by: OBSTETRICS & GYNECOLOGY

## 2023-06-22 ENCOUNTER — OFFICE VISIT (OUTPATIENT)
Dept: FAMILY MEDICINE | Facility: CLINIC | Age: 33
End: 2023-06-22
Payer: MEDICAID

## 2023-06-22 VITALS
TEMPERATURE: 98 F | DIASTOLIC BLOOD PRESSURE: 56 MMHG | SYSTOLIC BLOOD PRESSURE: 86 MMHG | WEIGHT: 263 LBS | HEIGHT: 62 IN | BODY MASS INDEX: 48.4 KG/M2 | RESPIRATION RATE: 18 BRPM | HEART RATE: 82 BPM | OXYGEN SATURATION: 99 %

## 2023-06-22 DIAGNOSIS — Z3A.24 24 WEEKS GESTATION OF PREGNANCY: Primary | ICD-10-CM

## 2023-06-22 DIAGNOSIS — O99.212 OBESITY AFFECTING PREGNANCY IN SECOND TRIMESTER: ICD-10-CM

## 2023-06-22 DIAGNOSIS — F41.9 ANXIETY DURING PREGNANCY: ICD-10-CM

## 2023-06-22 DIAGNOSIS — Z86.19 HISTORY OF HERPES GENITALIS: ICD-10-CM

## 2023-06-22 DIAGNOSIS — O99.340 ANXIETY DURING PREGNANCY: ICD-10-CM

## 2023-06-22 LAB
BILIRUB SERPL-MCNC: NORMAL MG/DL
BLOOD URINE, POC: NORMAL
CLARITY, POC UA: CLEAR
COLOR, POC UA: YELLOW
GLUCOSE 1H P 100 G GLC PO SERPL-MCNC: 99 MG/DL (ref 100–180)
GLUCOSE UR QL STRIP: NORMAL
KETONES UR QL STRIP: NORMAL
LEUKOCYTE ESTERASE URINE, POC: NORMAL
NITRITE, POC UA: NORMAL
PH, POC UA: 7
PROTEIN, POC: NORMAL
SPECIFIC GRAVITY, POC UA: 1.02
UROBILINOGEN, POC UA: 0.2

## 2023-06-22 PROCEDURE — 82950 GLUCOSE TEST: CPT

## 2023-06-22 PROCEDURE — 99214 OFFICE O/P EST MOD 30 MIN: CPT | Mod: PBBFAC

## 2023-06-22 PROCEDURE — 36415 COLL VENOUS BLD VENIPUNCTURE: CPT

## 2023-06-22 PROCEDURE — 81002 URINALYSIS NONAUTO W/O SCOPE: CPT | Mod: PBBFAC

## 2023-06-22 NOTE — PROGRESS NOTES
"[facility] OB OFFICE VISIT NOTE  Earlene Camilo  [id number]  2023    Chief Complaint: Routine Prenatal Visit (OB 24 weeks 4 days)      Earlene Camilo is a 33 y.o. female   24w4d EDC 10/8/2023 by 1st trimester US (CHERELLE Estimated Date of Delivery: 10/8/23) presenting to [facility]    Current Issues: None    Chronic Issues:   Anxiety/depression- on Zoloft 50 mg daily   GERD/ N/V- on Prevacid 30 mg daily and Vit B6  Obesity in pregnancy- BMI 47  Previous cerclage placement in 2nd pregnancy, no h/o progesterone use or no subsequent pre term deliveries, following MFM        Antepartum specific   - Fetal movements: Yes  - Vaginal bleeding: No  - Vaginal discharge: No  - Loss of fluid: No  - Contractions: No  - Headaches: No  - Vision changes: No  - Edema: No    Blood pressure (!) 86/56, pulse 82, temperature 97.7 °F (36.5 °C), temperature source Oral, resp. rate 18, height 5' 2" (1.575 m), weight 119.3 kg (263 lb), SpO2 99 %.   Physical Exam  FHT: 136 by doppler  Fundal Height: 24cm  GEN: NAD, AXO  No edema, RRR    Current Medications:   Current Outpatient Medications   Medication Sig Dispense Refill    fluticasone propionate (FLONASE) 50 mcg/actuation nasal spray fluticasone propionate Take 2 Spray(s) (nasal) 1 time per day for 30 days 2 sprays each nostril once daily. 2022 spray,suspension 1 time per day nasal 30 days active 50 mcg/actuation      lansoprazole (PREVACID) 30 MG capsule Take 1 capsule (30 mg total) by mouth once daily. 30 capsule 11    ondansetron (ZOFRAN) 8 MG tablet Take 8 mg by mouth 3 (three) times daily.      PRENATAL VITAMIN 27 mg iron- 0.8 mg Tab Take 1 tablet by mouth.      sertraline (ZOLOFT) 50 MG tablet Take 0.5 tablets (25 mg total) by mouth every evening for 7 days, THEN 1 tablet (50 mg total) every evening. 90 tablet 0     No current facility-administered medications for this visit.     Labs:  Urine dipstick:      Imaging:   Initial US:  Anatomy Scan:    Assessment:   1.  " weeks gestation of pregnancy    2. Obesity affecting pregnancy in second trimester    3. Anxiety during pregnancy        Plan:  POCT urine dipstick without microscope  -     OB Protocol   -     PNVs  -     Urine dip reviewed as above  -     Routine labs reviewed  -     Continue ASA, Zoloft, Prevacid  -     Early 1  hour today  -     BP low, asymptomatic. Dicussed increased hydration, small frequent meals        -     Mother plans to breastfeed        -     Postpartum contraception discussion: Interested in Tubal         -    Labor precautions discussed in depth         -    Keep follow up with MFM         Orders Placed This Encounter   Procedures    GTT 1 Hour    POCT urine dipstick without microscope

## 2023-06-29 ENCOUNTER — TELEPHONE (OUTPATIENT)
Dept: FAMILY MEDICINE | Facility: CLINIC | Age: 33
End: 2023-06-29
Payer: MEDICAID

## 2023-06-29 RX ORDER — SERTRALINE HYDROCHLORIDE 50 MG/1
TABLET, FILM COATED ORAL
Qty: 90 TABLET | Refills: 0 | Status: SHIPPED | OUTPATIENT
Start: 2023-06-29 | End: 2023-09-05 | Stop reason: SDUPTHER

## 2023-07-05 ENCOUNTER — PATIENT OUTREACH (OUTPATIENT)
Dept: FAMILY MEDICINE | Facility: CLINIC | Age: 33
End: 2023-07-05
Payer: MEDICAID

## 2023-07-05 NOTE — PROGRESS NOTES
Follow-up: 8/22/2023    Appointment reminder 7/25/2023        Patient verbalized understanding.Yes        Other comments:  Identity verified.  She denies abdominal pain, vaginal bleeding, CP, SOB, visual changes or edema.  Educated pt if she has any of these symptoms to present to the ED for evaluation.  Pt states she gets headaches that are relieved with rest.  Pt states she continues to get low BS and dizziness.  She states her OB doctor is aware.  Instructed to make sure she eats all her meals and try adding an am and PM snack.  Pt states she has a decreased appetite.  Instructed to check with her OB doctor to find out if she can drink protein shakes when she is not hungry.  Pt endorses positive fetal movement.  Pt denies SI/HI.  She states she received the refill on the Zoloft, but it was only for 30 pills.  Instructed pt that the prescription was for 90 pills and to contact the pharmacy to find out why she only got 30 pills.  Instructed pt that there are no refills on the Zoloft prescription and to contact the pharmacy when she is down to 7 pills so that a request for a refill can be sent to the doctor.  Pt had a maternal fetal medicine OB ultrasound 6/12/2023 and an OB appt 6/22/2023 where a 1 hr GTT was ordered (pt states she passed this test), increase fluid, RTC 4 weeks.  Pt states she drinks 8 bottles of water a day.  Patient denies any SDOH barriers at this time.  Stressed the importance of medication compliance, keeping appointments and reinforced the use of urgent care clinic for non emergent/non OB issues when PCP unavailable.  Patient verbalized understanding to all instructions.                     Education given on See above

## 2023-07-21 ENCOUNTER — OFFICE VISIT (OUTPATIENT)
Dept: FAMILY MEDICINE | Facility: CLINIC | Age: 33
End: 2023-07-21
Payer: MEDICAID

## 2023-07-21 VITALS
BODY MASS INDEX: 48.14 KG/M2 | WEIGHT: 261.63 LBS | SYSTOLIC BLOOD PRESSURE: 96 MMHG | HEIGHT: 62 IN | TEMPERATURE: 98 F | RESPIRATION RATE: 20 BRPM | DIASTOLIC BLOOD PRESSURE: 70 MMHG | HEART RATE: 94 BPM | OXYGEN SATURATION: 98 %

## 2023-07-21 DIAGNOSIS — Z3A.28 28 WEEKS GESTATION OF PREGNANCY: Primary | ICD-10-CM

## 2023-07-21 DIAGNOSIS — N94.9 ROUND LIGAMENT PAIN: ICD-10-CM

## 2023-07-21 LAB
BILIRUB SERPL-MCNC: NORMAL MG/DL
BLOOD URINE, POC: NORMAL
CLARITY, POC UA: CLEAR
COLOR, POC UA: YELLOW
GLUCOSE UR QL STRIP: NORMAL
KETONES UR QL STRIP: NORMAL
LEUKOCYTE ESTERASE URINE, POC: NORMAL
NITRITE, POC UA: NORMAL
PH, POC UA: 6
PROTEIN, POC: NORMAL
SPECIFIC GRAVITY, POC UA: 1.02
UROBILINOGEN, POC UA: 0.2

## 2023-07-21 PROCEDURE — 99215 OFFICE O/P EST HI 40 MIN: CPT | Mod: PBBFAC,27 | Performed by: STUDENT IN AN ORGANIZED HEALTH CARE EDUCATION/TRAINING PROGRAM

## 2023-07-21 PROCEDURE — 81002 URINALYSIS NONAUTO W/O SCOPE: CPT | Mod: PBBFAC | Performed by: STUDENT IN AN ORGANIZED HEALTH CARE EDUCATION/TRAINING PROGRAM

## 2023-07-21 NOTE — PROGRESS NOTES
[facility] OB OFFICE VISIT NOTE  Earlene Camilo  [id number]  2023      Chief Complaint: Hip Pain and Back Pain      Earlene Camilo is a 33 y.o.  female 28w5d by late first trimester ultrasound (CHERELLE Estimated Date of Delivery: 10/8/23) presenting to [facility] for due to hip, back and groin pain. Follows in [facility] high risk OB clinic for routine prenatal care.     Hip, back and groin pain  - constant, daily for 1-1.5 weeks, stable in intensity   - tried heat/ice, no oral medications  - worse laying flat and with movement   - difficulty falling and staying asleep due to pain  - BMI 47.8  - mild nausea (constant during pregnancy)  - denies abdominal pain, contractions, vomiting, dysuria, hematuria, worsening pain, fever       Antepartum specific ROS   - Fetal movements: yes  - Vaginal bleeding: yes  - Vaginal discharge: no  - Dysuria: no  - Loss of fluid: no  - Contractions: no  - Headaches: occasional, resolves on it's own   - Vision changes: no  - Edema: no    Physical Exam   Vitals:    23 1119   BP: 96/70   Pulse: 94   Resp: 20   Temp: 97.9 °F (36.6 °C)   BMI= 47     General: in no acute distress  RESP: non labored  CV: regular rate and rhythm, no murmurs, no edema  ABD: gravid, nontender, no rebound or guarding, BS+  : no suprapubic tenderness, no CVA tenderness   FHTs: 148 bpm  Fundal height: difficulty appreciating due to body habitus  MSK: tenderness to paraspinal muscles and diffusely over hips (does not reproduce reported pain), no midline spinous tenderness, straight leg raise negative, hamstring tightness present with straight leg, log roll causing discomfort in hips, no tenderness along groin at area of reported pain, gait wnl       Labs:  Urine dipstick:    Color, UA Yellow Ketones, UA NEG   pH, UA 6.0 Urobilinogen, UA 0.2   WBC, UA NEG Bilirubin, POC NEG   Nitrite, UA NEG Blood, UA NEG   Protein, POC NEG Clarity, UA Clear   Glucose, UA NEG Spec Grav UA 1.025          Assessment:   1. 28 weeks  gestation of pregnancy    2. Round ligament pain      Plan:  - heat/ice, OTC Tylenol prn as instructed on label, belly band, proper body mechanics discussed, prenatal yoga/stretching   - urine dip reviewed, not consistent with UTI   - offered to order 28 week OB labs, patient declines- states will have them done at upcoming routine OB visit   - OB ED/ labor precautions discussed in depth to include significantly worsening pain despite treatment, change in character or location of pain, intractable nausea/ vomiting, vaginal bleeding, contractions, severe abdominal pain, loss of fluid or decreased fetal movement    Keep previously scheduled routine HROB appointment 7//27/23. Call office sooner if needed.     Genie Smith MD  [employer] FM Resident, -III

## 2023-07-23 NOTE — PROGRESS NOTES
Discussed with resident at time of encounter 07-21-23.  Resident's note reviewed 07-22-23.  Agree with assessment; plan of care appropriate.  Professional services provided in an outpatient primary care center affiliated with a [facility] institution.

## 2023-07-25 ENCOUNTER — PATIENT OUTREACH (OUTPATIENT)
Dept: FAMILY MEDICINE | Facility: CLINIC | Age: 33
End: 2023-07-25
Payer: MEDICAID

## 2023-07-25 NOTE — PROGRESS NOTES
Appointment Reminder 8/9/2023  Follow-up: 8/9/2023      Appointment reminder given for [facility] 7/27/2023 1330        Patient verbalized understanding.Yes        Other comments:  Identity verified.  Reminded patient of appointment with [facility] 7/27/2023 1330  Patient verbalized understanding. Pt states she had an appt with Behavioral Health 7/21/23 and was having hip, back, groin, and was sent to OB to be seen.  Pt had OB appt and was diagnosed with round ligament pain, OTC Tylenol, heat, ice to area.  Pt states the pain continues, but has improved some with the heat/ice.  She declines to take the Tylenol.  Patient denies abdominal pain, vaginal bleeding or leaking fluid, CP, SOB, edema, HA, visual changes, contractions, but endorses positive fetal movement.  Pt states she gets edema in her feet when she is on them a lot.  She states the swelling decreases the next day after rest and elevation.                    Education given on

## 2023-07-27 ENCOUNTER — OFFICE VISIT (OUTPATIENT)
Dept: FAMILY MEDICINE | Facility: CLINIC | Age: 33
End: 2023-07-27
Payer: MEDICAID

## 2023-07-27 VITALS
RESPIRATION RATE: 20 BRPM | SYSTOLIC BLOOD PRESSURE: 103 MMHG | DIASTOLIC BLOOD PRESSURE: 68 MMHG | HEIGHT: 62 IN | HEART RATE: 64 BPM | WEIGHT: 276.63 LBS | BODY MASS INDEX: 50.91 KG/M2 | OXYGEN SATURATION: 98 % | TEMPERATURE: 98 F

## 2023-07-27 DIAGNOSIS — O99.342 MENTAL DISORDER DURING PREGNANCY IN SECOND TRIMESTER: ICD-10-CM

## 2023-07-27 DIAGNOSIS — K21.9 GASTROESOPHAGEAL REFLUX DISEASE WITHOUT ESOPHAGITIS: ICD-10-CM

## 2023-07-27 DIAGNOSIS — Z3A.29 29 WEEKS GESTATION OF PREGNANCY: Primary | ICD-10-CM

## 2023-07-27 DIAGNOSIS — M79.10 PAIN ON MOVEMENT OF SKELETAL MUSCLE: ICD-10-CM

## 2023-07-27 DIAGNOSIS — Z98.890 HISTORY OF CERVICAL CERCLAGE: ICD-10-CM

## 2023-07-27 LAB
BASOPHILS # BLD AUTO: 0.03 X10(3)/MCL
BASOPHILS NFR BLD AUTO: 0.3 %
BILIRUB SERPL-MCNC: NORMAL MG/DL
BLOOD URINE, POC: NORMAL
CLARITY, POC UA: NORMAL
COLOR, POC UA: YELLOW
EOSINOPHIL # BLD AUTO: 0.07 X10(3)/MCL (ref 0–0.9)
EOSINOPHIL NFR BLD AUTO: 0.7 %
ERYTHROCYTE [DISTWIDTH] IN BLOOD BY AUTOMATED COUNT: 15.1 % (ref 11.5–17)
GLUCOSE 1H P 100 G GLC PO SERPL-MCNC: 144 MG/DL (ref 100–180)
GLUCOSE UR QL STRIP: NORMAL
HCT VFR BLD AUTO: 36.5 % (ref 37–47)
HGB BLD-MCNC: 11.9 G/DL (ref 12–16)
IMM GRANULOCYTES # BLD AUTO: 0.06 X10(3)/MCL (ref 0–0.04)
IMM GRANULOCYTES NFR BLD AUTO: 0.6 %
KETONES UR QL STRIP: NORMAL
LEUKOCYTE ESTERASE URINE, POC: NORMAL
LYMPHOCYTES # BLD AUTO: 2.15 X10(3)/MCL (ref 0.6–4.6)
LYMPHOCYTES NFR BLD AUTO: 22.7 %
MCH RBC QN AUTO: 27.4 PG (ref 27–31)
MCHC RBC AUTO-ENTMCNC: 32.6 G/DL (ref 33–36)
MCV RBC AUTO: 83.9 FL (ref 80–94)
MONOCYTES # BLD AUTO: 0.45 X10(3)/MCL (ref 0.1–1.3)
MONOCYTES NFR BLD AUTO: 4.7 %
NEUTROPHILS # BLD AUTO: 6.72 X10(3)/MCL (ref 2.1–9.2)
NEUTROPHILS NFR BLD AUTO: 71 %
NITRITE, POC UA: NORMAL
NRBC BLD AUTO-RTO: 0 %
PH, POC UA: 7
PLATELET # BLD AUTO: 288 X10(3)/MCL (ref 130–400)
PMV BLD AUTO: 10.9 FL (ref 7.4–10.4)
PROTEIN, POC: NORMAL
RBC # BLD AUTO: 4.35 X10(6)/MCL (ref 4.2–5.4)
SPECIFIC GRAVITY, POC UA: 1.02
T PALLIDUM AB SER QL: NONREACTIVE
UROBILINOGEN, POC UA: 0.2
WBC # SPEC AUTO: 9.48 X10(3)/MCL (ref 4.5–11.5)

## 2023-07-27 PROCEDURE — 99214 OFFICE O/P EST MOD 30 MIN: CPT | Mod: PBBFAC

## 2023-07-27 PROCEDURE — 36415 COLL VENOUS BLD VENIPUNCTURE: CPT

## 2023-07-27 PROCEDURE — 81002 URINALYSIS NONAUTO W/O SCOPE: CPT | Mod: PBBFAC

## 2023-07-27 PROCEDURE — 85025 COMPLETE CBC W/AUTO DIFF WBC: CPT

## 2023-07-27 PROCEDURE — 82950 GLUCOSE TEST: CPT

## 2023-07-27 PROCEDURE — 86780 TREPONEMA PALLIDUM: CPT

## 2023-07-27 RX ORDER — FAMOTIDINE 20 MG/1
20 TABLET, FILM COATED ORAL NIGHTLY PRN
Qty: 30 TABLET | Refills: 2 | Status: SHIPPED | OUTPATIENT
Start: 2023-07-27 | End: 2023-11-16

## 2023-07-27 NOTE — PROGRESS NOTES
OB Office Visit Note    Name: Earlene Camilo  MRN: [id number]  Date: 2023    Subjective:      Chief Complaint: Routine Prenatal Visit (Pt reports hip, lower back, lower abd,and neck pain for the last 2 weeks . Currently rates pain 8/10. Pt is HROB, 29w5d. Pt also reports prevacid is not helping)      Earlene Camilo is a 33 y.o.  at female 29w5d by late first trimester ultrasound (CHERELLE Estimated Date of Delivery: 10/8/23) that presents to OB clinic.    Current issues: GERD symptoms. Does not think Prevacid works as well as Prilosec. Also reporting neck pain and stiffness. Unsure if she slept wrong. Also has hip and lower back pain. Was seen in walk in clinic a week ago. Patient has been using warm and cold compress. Also uses tylenol and OTC topical analgesics. Recently purchased belly support band. Reports lying down most day due to pain    Chronic issues:   Anxiety/depression- on Zoloft 50 mg daily   GERD/ N/V- on Prevacid 30 mg daily and Vit B6  Obesity   Previous cerclage placement in 2nd pregnancy, no h/o progesterone use or no subsequent pre term deliveries, following MFM      Antepartum specific ROS  - Fetal movements: Yes   - Vaginal bleeding: No  - Vaginal discharge: No  - Loss of fluid: No  - Contractions: No  - Headaches: No  - Vision changes: No  - Edema: No    Prior to Admission medications    Medication Sig Start Date End Date Taking? Authorizing Provider   aspirin (ECOTRIN) 81 MG EC tablet Take 81 mg by mouth once daily. Started when she was 3 months pregnant    Historical Provider   docusate sodium (COLACE) 100 MG capsule Take 100 mg by mouth 2 (two) times daily as needed for Constipation.    Historical Provider   fluticasone propionate (FLONASE) 50 mcg/actuation nasal spray fluticasone propionate Take 2 Spray(s) (nasal) 1 time per day for 30 days 2 sprays each nostril once daily. 2022 spray,suspension 1 time per day nasal 30 days active 50 mcg/actuation 22   Historical Provider    lansoprazole (PREVACID) 30 MG capsule Take 1 capsule (30 mg total) by mouth once daily. 3/30/23 3/29/24  Jasmine Orozco MD   ondansetron (ZOFRAN) 8 MG tablet Take 8 mg by mouth 3 (three) times daily. 3/29/23   Historical Provider   PRENATAL VITAMIN 27 mg iron- 0.8 mg Tab Take 1 tablet by mouth. 3/30/23   Historical Provider   pyridoxine, vitamin B6, (VITAMIN B-6) 25 MG Tab Take 25 mg by mouth once daily.    Historical Provider   sertraline (ZOLOFT) 50 MG tablet Take 0.5 tablets (25 mg total) by mouth every evening for 7 days, THEN 1 tablet (50 mg total) every evening. 23  Celio Cowan MD     Allergies:   Review of patient's allergies indicates:   Allergen Reactions    Latex, natural rubber Rash       Gestational History:   OB History    Para Term  AB Living   5 3 3 0 1 3   SAB IAB Ectopic Multiple Live Births   1 0 0 0 3      # Outcome Date GA Lbr Jarret/2nd Weight Sex Delivery Anes PTL Lv   5 Current            4 Term 18 39w3d  3.317 kg (7 lb 5 oz) F Vag-Spont None Y DINA   3 Term 17 40w0d  3.946 kg (8 lb 11.2 oz) M Vag-Spont EPI N DINA   2 Term 10/04/15 41w5d  3.402 kg (7 lb 8 oz) M Vag-Spont EPI Y DINA   1 SAB 13 18w0d   F    ND       Objective:     Vitals:    23 1351   BP: 103/68   Pulse: 64   Resp: 20   Temp: 97.7 °F (36.5 °C)     General:   Neck: trapezius spasm, OMT with Dr. Hebert  RESP: clear to auscultation bilaterally, non labored  CV: regular rate and rhythm, no murmurs, no edema  ABD: gravid, nontender, BS+   FHTs: 145  bpm;   Fundal height: 30cm      Initial OB Labs:    - Blood Type and Rh:  A positive  - Antibody Screen: Negatice  - CBC H/H: 11.5/36  - HIV: Nonreactive  - RPR: Non reactive  - GC: Not Detected  - CT: Not Detected  - HBsAg: Nonreactive  - HCVAb: Nonreactive  - Rubella: Immune  - Varicella: Immune  - Sickle Cell Screen: Negative  - PAP: NIL      15-20 Weeks:   - Quad Screen: Normal Risk    - 20 wk anatomy US:  Impression   =========    Transvaginal cervical length measures 4.1cm. A viable varner pregnancy is present in breech presentation. No new abnormalities are noted. Diaphragm, 4CH, 3VT, RVOT   remain suboptimal.     Recommendation   ==============   Return in 4 weeks for completion of anatomy.            Urine dip:  Lab Results   Component Value Date    COLORU Yellow 07/21/2023    SPECGRAV 1.025 07/21/2023    PHUR 6.0 07/21/2023    WBCUR NEG 07/21/2023    NITRITE NEG 07/21/2023    PROTEINPOC NEG 07/21/2023    GLUCOSEUR NEG 07/21/2023    KETONESU NEG 07/21/2023    UROBILINOGEN 0.2 07/21/2023    BILIRUBINPOC NEG 07/21/2023    RBCUR NEG 07/21/2023     Assessment/Plan:     Earlene was seen today for routine prenatal visit.    Diagnoses and all orders for this visit:    29 weeks gestation of pregnancy  -     GTT 1 Hour; Future  -     CBC Auto Differential; Future  -     SYPHILIS ANTIBODY (WITH REFLEX RPR); Future    3. Anxiety/depression/hx PP depression during pregnancy in second trimester    Gastroesophageal reflux disease without esophagitis    History of cervical cerclage         Pregnancy  POCT urine dipstick without microscope  -     OB Protocol   -     PNVs  -     Urine dip reviewed as above  -     Routine labs reviewed  -     Continue ASA, Zoloft, Prevacid  -     Added Pepcid for GERD symptoms  -     Yoga stretches given  -     Repeat 1 hour lucose        -     Mother plans to breastfeed        -     Postpartum contraception discussion: Interested in Tubal; consent signed         -    Labor precautions discussed in depth         -    Keep follow up with MFM         -    NSTs next visit

## 2023-08-03 ENCOUNTER — LAB VISIT (OUTPATIENT)
Dept: FAMILY MEDICINE | Facility: CLINIC | Age: 33
End: 2023-08-03
Payer: MEDICAID

## 2023-08-03 DIAGNOSIS — R73.09 ABNORMAL GLUCOSE: ICD-10-CM

## 2023-08-03 DIAGNOSIS — R73.09 ABNORMAL GLUCOSE: Primary | ICD-10-CM

## 2023-08-03 LAB
GLUCOSE 1H P 100 G GLC PO SERPL-MCNC: 164 MG/DL (ref 100–180)
GLUCOSE 2H P 100 G GLC PO SERPL-MCNC: 109 MG/DL (ref 70–140)
GLUCOSE 3H P 100 G GLC PO SERPL-MCNC: 83 MG/DL (ref 70–115)
GLUCOSE P FAST SERPL-MCNC: 85 MG/DL (ref 70–155)

## 2023-08-03 PROCEDURE — 82947 ASSAY GLUCOSE BLOOD QUANT: CPT

## 2023-08-03 PROCEDURE — 36415 COLL VENOUS BLD VENIPUNCTURE: CPT

## 2023-08-03 PROCEDURE — 82950 GLUCOSE TEST: CPT

## 2023-08-03 PROCEDURE — 82951 GLUCOSE TOLERANCE TEST (GTT): CPT

## 2023-08-03 PROCEDURE — 82952 GTT-ADDED SAMPLES: CPT

## 2023-08-09 ENCOUNTER — PATIENT OUTREACH (OUTPATIENT)
Dept: FAMILY MEDICINE | Facility: CLINIC | Age: 33
End: 2023-08-09
Payer: MEDICAID

## 2023-08-09 NOTE — PROGRESS NOTES
"Appointment Reminder: 8/15/2023   Follow-up: 9/6/2023    Appointment reminder given for Behavioral Health 8/11/2023 0830 virtual visit.        Patient verbalized understanding: Yes        Other comments: Identity verified.  Pt denies SI/HI.  Pt states she has lower abdominal pain from a " pulled round ligament."  She states she is wearing a belly band to help with the pain.  Pt states she is having occasional Murali Carlson contractions, occasional headaches, and some mild swelling of feet.   Patient denies vaginal bleeding or leaking fluid, CP, SOB, visual changes, contractions, but endorses positive fetal movement.  Instructed patient if she has any of the above symptoms to present to the ED for evaluation.  Pt had OB appt 7/21/23 diagnoses with round ligament pain.  Instructed to apply ice and heat, take OTC Tylenol and wear a belly band.  Pt had OB appt 7/27/23, continue present management, NST next OB visit.  Reminded patient of virtual appointment with Behavioral Health 8/11/2023 0830.  Patient denies any SDOH barriers at this time. Stressed the importance of medication compliance, keeping appointments and reinforced the use of urgent care clinic for non emergent issues until PCP established.  Patient verbalized understanding to all instructions.                     Education given on     "

## 2023-08-11 ENCOUNTER — OFFICE VISIT (OUTPATIENT)
Dept: FAMILY MEDICINE | Facility: CLINIC | Age: 33
End: 2023-08-11
Payer: MEDICAID

## 2023-08-11 DIAGNOSIS — Z59.6 LOW INCOME: ICD-10-CM

## 2023-08-11 DIAGNOSIS — F32.1 MODERATE MAJOR DEPRESSION: ICD-10-CM

## 2023-08-11 DIAGNOSIS — Z34.93 THIRD TRIMESTER PREGNANCY: ICD-10-CM

## 2023-08-11 DIAGNOSIS — F41.1 GAD (GENERALIZED ANXIETY DISORDER): ICD-10-CM

## 2023-08-11 DIAGNOSIS — F90.2 ADHD (ATTENTION DEFICIT HYPERACTIVITY DISORDER), COMBINED TYPE: ICD-10-CM

## 2023-08-11 PROCEDURE — 99215 OFFICE O/P EST HI 40 MIN: CPT | Mod: 95,,, | Performed by: NURSE PRACTITIONER

## 2023-08-11 PROCEDURE — 90838 PR PSYCHOTHERAPY W/PATIENT W/E&M, 60 MIN (ADD ON): ICD-10-PCS | Mod: 95,,, | Performed by: NURSE PRACTITIONER

## 2023-08-11 PROCEDURE — 90838 PSYTX W PT W E/M 60 MIN: CPT | Mod: 95,,, | Performed by: NURSE PRACTITIONER

## 2023-08-11 PROCEDURE — 1159F PR MEDICATION LIST DOCUMENTED IN MEDICAL RECORD: ICD-10-PCS | Mod: CPTII,95,, | Performed by: NURSE PRACTITIONER

## 2023-08-11 PROCEDURE — 1159F MED LIST DOCD IN RCRD: CPT | Mod: CPTII,95,, | Performed by: NURSE PRACTITIONER

## 2023-08-11 PROCEDURE — 99417 PR PROLONGED SVC, OUTPT, W/WO DIRECT PT CONTACT,  EA ADDTL 15 MIN: ICD-10-PCS | Mod: 95,,, | Performed by: NURSE PRACTITIONER

## 2023-08-11 PROCEDURE — 99417 PROLNG OP E/M EACH 15 MIN: CPT | Mod: 95,,, | Performed by: NURSE PRACTITIONER

## 2023-08-11 PROCEDURE — 99215 PR OFFICE/OUTPT VISIT, EST, LEVL V, 40-54 MIN: ICD-10-PCS | Mod: 95,,, | Performed by: NURSE PRACTITIONER

## 2023-08-11 SDOH — SOCIAL DETERMINANTS OF HEALTH (SDOH): LOW INCOME: Z59.6

## 2023-08-11 NOTE — PATIENT INSTRUCTIONS
Meds as directed  Keep all healthcare appointments  Utilize self-interventions from patient education materials  Call [facility] or go to nearest ER if overwhelmed   Clinic 4 weeks - virtual appointment
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
## 2023-08-15 ENCOUNTER — PATIENT OUTREACH (OUTPATIENT)
Dept: FAMILY MEDICINE | Facility: CLINIC | Age: 33
End: 2023-08-15
Payer: MEDICAID

## 2023-08-15 NOTE — PROGRESS NOTES
Appointment Reminder 8/18/2023  Follow-up: 9/6/2023    Appointment reminder given for: Identity verified.  Reminded patient of appointment with [facility], OB 8/17/2023 1045.  Patient verbalized understanding.         Patient verbalized understanding.        Other comments:  Patient denies abdominal pain , vaginal bleeding, but endorses positive fetal movement.                    Education given on

## 2023-08-17 ENCOUNTER — OFFICE VISIT (OUTPATIENT)
Dept: FAMILY MEDICINE | Facility: CLINIC | Age: 33
End: 2023-08-17
Payer: MEDICAID

## 2023-08-17 VITALS
OXYGEN SATURATION: 99 % | BODY MASS INDEX: 48.65 KG/M2 | HEIGHT: 62 IN | SYSTOLIC BLOOD PRESSURE: 103 MMHG | RESPIRATION RATE: 20 BRPM | WEIGHT: 264.38 LBS | HEART RATE: 98 BPM | TEMPERATURE: 98 F | DIASTOLIC BLOOD PRESSURE: 69 MMHG

## 2023-08-17 DIAGNOSIS — O09.292 PRIOR POOR OBSTETRICAL HISTORY IN SECOND TRIMESTER, ANTEPARTUM: ICD-10-CM

## 2023-08-17 DIAGNOSIS — O99.340 ANXIETY DURING PREGNANCY: ICD-10-CM

## 2023-08-17 DIAGNOSIS — J30.9 ALLERGIC RHINITIS, UNSPECIFIED SEASONALITY, UNSPECIFIED TRIGGER: ICD-10-CM

## 2023-08-17 DIAGNOSIS — O99.340 DEPRESSION AFFECTING PREGNANCY: ICD-10-CM

## 2023-08-17 DIAGNOSIS — O99.213 OBESITY AFFECTING PREGNANCY IN THIRD TRIMESTER: ICD-10-CM

## 2023-08-17 DIAGNOSIS — F41.9 ANXIETY DURING PREGNANCY: ICD-10-CM

## 2023-08-17 DIAGNOSIS — J06.9 VIRAL UPPER RESPIRATORY TRACT INFECTION WITH COUGH: ICD-10-CM

## 2023-08-17 DIAGNOSIS — K21.9 GASTROESOPHAGEAL REFLUX DISEASE WITHOUT ESOPHAGITIS: Primary | ICD-10-CM

## 2023-08-17 DIAGNOSIS — F32.A DEPRESSION AFFECTING PREGNANCY: ICD-10-CM

## 2023-08-17 PROBLEM — O99.342 MENTAL DISORDER DURING PREGNANCY IN SECOND TRIMESTER: Status: RESOLVED | Noted: 2023-04-26 | Resolved: 2023-08-17

## 2023-08-17 LAB
BILIRUB SERPL-MCNC: NORMAL MG/DL
BLOOD URINE, POC: NORMAL
CLARITY, POC UA: CLEAR
COLOR, POC UA: YELLOW
FLUAV AG UPPER RESP QL IA.RAPID: NOT DETECTED
FLUBV AG UPPER RESP QL IA.RAPID: NOT DETECTED
GLUCOSE UR QL STRIP: NORMAL
KETONES UR QL STRIP: NORMAL
LEUKOCYTE ESTERASE URINE, POC: NORMAL
NITRITE, POC UA: NORMAL
PH, POC UA: 7
PROTEIN, POC: NORMAL
SARS-COV-2 RNA RESP QL NAA+PROBE: NOT DETECTED
SPECIFIC GRAVITY, POC UA: 1.02
UROBILINOGEN, POC UA: 1

## 2023-08-17 PROCEDURE — 0240U COVID/FLU A&B PCR: CPT

## 2023-08-17 PROCEDURE — 81002 URINALYSIS NONAUTO W/O SCOPE: CPT | Mod: PBBFAC

## 2023-08-17 PROCEDURE — 99214 OFFICE O/P EST MOD 30 MIN: CPT | Mod: PBBFAC

## 2023-08-17 NOTE — PROGRESS NOTES
FETAL ASSESSMENT REPORT    RE: Earlene Camilo  MRN:  [id number]  :  1990  AGE:  33 y.o.    Date:  2023    REFERRAL PHYSICIAN: [facility]    Allergies: Latex, natural rubber    Earlene is a 33 y.o.  at 32w4d gestational age here today for a NST.    10/8/2023, by Ultrasound    MEDICATIONS AT TIME OF TEST:    Current Outpatient Medications   Medication Sig Dispense Refill    aspirin (ECOTRIN) 81 MG EC tablet Take 81 mg by mouth once daily. Started when she was 3 months pregnant      docusate sodium (COLACE) 100 MG capsule Take 100 mg by mouth 2 (two) times daily as needed for Constipation.      famotidine (PEPCID) 20 MG tablet Take 1 tablet (20 mg total) by mouth nightly as needed for Heartburn. 30 tablet 2    fluticasone propionate (FLONASE) 50 mcg/actuation nasal spray fluticasone propionate Take 2 Spray(s) (nasal) 1 time per day for 30 days 2 sprays each nostril once daily. 2022 spray,suspension 1 time per day nasal 30 days active 50 mcg/actuation      lansoprazole (PREVACID) 30 MG capsule Take 1 capsule (30 mg total) by mouth once daily. 30 capsule 11    ondansetron (ZOFRAN) 8 MG tablet Take 8 mg by mouth 3 (three) times daily.      PRENATAL VITAMIN 27 mg iron- 0.8 mg Tab Take 1 tablet by mouth.      pyridoxine, vitamin B6, (VITAMIN B-6) 25 MG Tab Take 25 mg by mouth once daily.      sertraline (ZOLOFT) 50 MG tablet Take 0.5 tablets (25 mg total) by mouth every evening for 7 days, THEN 1 tablet (50 mg total) every evening. 90 tablet 0     No current facility-administered medications for this visit.       Indication: h/o abruption    Interpretation:  140 BPM baseline    Variability:  Good {> 6 bpm)    Accelerations:  Reactive    Decelerations:  none    Assessment: reactive    Plan:  NST scheduled, NST reactive      Daniel De Anda MD  2023; 1:21 PM

## 2023-08-17 NOTE — PROGRESS NOTES
Subjective:      Patient ID: Earlene Camilo is a 33 y.o. female.    Chief Complaint:  Routine Prenatal Visit (HROB 32w4d, c/o cough and sinus congestion for past 3 day, denies fever, has migraine headache at times relieved with cold pack.)      History of Present Illness  Earlene Camilo is a 33 y.o.  at female 32w4d by late first trimester ultrasound (CHERELLE Estimated Date of Delivery: 10/8/23) that presents to OB clinic    Current Issues: Cough, cold symptoms x 1 week. Has been using Mucinex DM. Unsure if she has had fever. Would like a covid test. GERD symptoms improved.     Chronic issues:   Anxiety/depression- on Zoloft 50 mg daily   GERD/ N/V- on Prevacid 30 mg daily and Vit B6  Obesity   Previous cerclage placement in 2nd pregnancy, no h/o progesterone use or no subsequent pre term deliveries, following MFM      GYN & OB History  No LMP recorded (lmp unknown). Patient is pregnant.   Date of Last Pap: 2023    OB History    Para Term  AB Living   5 3 3 0 1 3   SAB IAB Ectopic Multiple Live Births   1 0 0 0 3      # Outcome Date GA Lbr Jarret/2nd Weight Sex Delivery Anes PTL Lv   5 Current            4 Term 18 39w3d  3.317 kg (7 lb 5 oz) F Vag-Spont None Y DINA   3 Term 17 40w0d  3.946 kg (8 lb 11.2 oz) M Vag-Spont EPI N DINA   2 Term 10/04/15 41w5d  3.402 kg (7 lb 8 oz) M Vag-Spont EPI Y DINA   1 SAB 13 18w0d   F    ND       Review of Systems  - Fetal movements: Yes   - Vaginal bleeding: No  - Vaginal discharge: No  - Loss of fluid: No  - Contractions: No  - Headaches: No  - Vision changes: No  - Edema: Occasional       Objective:     Physical Exam   Vitals:    23 1114   BP: 103/69   Pulse: 98   Resp: 20   Temp: 98 °F (36.7 °C)     RESP: clear to auscultation bilaterally, non labored  CV: regular rate and rhythm, no murmurs, no edema  ABD: gravid, nontender, BS+   FHTs: 145  bpm on NST  Vitals:    23 1114   BP: 103/69   Pulse: 98   Resp: 20   Temp: 98 °F (36.7 °C)      Initial OB Labs:    - Blood Type and Rh:  A positive  - Antibody Screen: Negative  - CBC H/H: 11.5/36  - HIV: Nonreactive  - RPR: Non reactive  - GC: Not Detected  - CT: Not Detected  - HBsAg: Nonreactive  - HCVAb: Nonreactive  - Rubella: Immune  - Varicella: Immune  - Sickle Cell Screen: Negative  - PAP: NIL    15-20 Weeks:   - Quad Screen: Normal Risk    28 weeks lab:  - 1H GTT: failed 1 hour, passed 3 hour  - Rhogam:   - Date of Tdap:   - CBC H/H: 11.9/36.5  - Syphilis Ab: Non reactive  - BTL consent: signed 7/28/23    Assessment:     1. Gastroesophageal reflux disease without esophagitis    2. Allergic rhinitis, unspecified seasonality, unspecified trigger    3. Anxiety during pregnancy    4. Depression affecting pregnancy    5. Obesity affecting pregnancy in third trimester    6. 1. Prior poor obstetrical history in second trimester, antepartum              Plan:     POCT urine dipstick without microscope  -     OB Protocol   -     PNVs, ASA  -     Urine dip reviewed as above  -     Routine labs reviewed  -     Continue Zoloft, Prevacid  -     Added Pepcid for GERD symptoms        -     Mother plans to breastfeed        -     Postpartum contraception discussion: Interested in Tubal; consent signed         -    Labor precautions discussed in depth         -    Keep follow up with MFM         -    NST reactive

## 2023-08-18 ENCOUNTER — PATIENT OUTREACH (OUTPATIENT)
Dept: FAMILY MEDICINE | Facility: CLINIC | Age: 33
End: 2023-08-18
Payer: MEDICAID

## 2023-08-18 NOTE — PROGRESS NOTES
Appointment Reminder: 8/22/2023  Follow-up: 9/6/2023    Appointment reminder given for: Sent text message to remind patient of appointment [facility] 8/21/23 1000 ultrasound and 1030 office visit.  Also provided clinic phone number.        Patient verbalized understanding.        Other comments: Sent text message to remind patient of appointment with [facility] 8/21/23 1000 ultrasound and 1030 office visit.  Also provided clinic phone number.                    Education given on

## 2023-08-21 ENCOUNTER — OFFICE VISIT (OUTPATIENT)
Dept: MATERNAL FETAL MEDICINE | Facility: CLINIC | Age: 33
End: 2023-08-21
Payer: MEDICAID

## 2023-08-21 ENCOUNTER — PROCEDURE VISIT (OUTPATIENT)
Dept: MATERNAL FETAL MEDICINE | Facility: CLINIC | Age: 33
End: 2023-08-21
Payer: MEDICAID

## 2023-08-21 ENCOUNTER — PATIENT OUTREACH (OUTPATIENT)
Dept: FAMILY MEDICINE | Facility: CLINIC | Age: 33
End: 2023-08-21
Payer: MEDICAID

## 2023-08-21 VITALS
DIASTOLIC BLOOD PRESSURE: 56 MMHG | HEART RATE: 109 BPM | BODY MASS INDEX: 48.43 KG/M2 | WEIGHT: 264.75 LBS | SYSTOLIC BLOOD PRESSURE: 105 MMHG

## 2023-08-21 DIAGNOSIS — O99.213 OBESITY AFFECTING PREGNANCY IN THIRD TRIMESTER: ICD-10-CM

## 2023-08-21 DIAGNOSIS — O09.293 PRIOR POOR OBSTETRICAL HISTORY IN THIRD TRIMESTER, ANTEPARTUM: ICD-10-CM

## 2023-08-21 DIAGNOSIS — O99.342 MENTAL DISORDER DURING PREGNANCY IN SECOND TRIMESTER: ICD-10-CM

## 2023-08-21 DIAGNOSIS — O09.292 PRIOR POOR OBSTETRICAL HISTORY IN SECOND TRIMESTER, ANTEPARTUM: ICD-10-CM

## 2023-08-21 DIAGNOSIS — O99.343 MENTAL DISORDER DURING PREGNANCY IN THIRD TRIMESTER: ICD-10-CM

## 2023-08-21 DIAGNOSIS — O99.212 OBESITY AFFECTING PREGNANCY IN SECOND TRIMESTER: ICD-10-CM

## 2023-08-21 PROCEDURE — 3078F DIAST BP <80 MM HG: CPT | Mod: CPTII,S$GLB,, | Performed by: OBSTETRICS & GYNECOLOGY

## 2023-08-21 PROCEDURE — 1159F PR MEDICATION LIST DOCUMENTED IN MEDICAL RECORD: ICD-10-PCS | Mod: CPTII,S$GLB,, | Performed by: OBSTETRICS & GYNECOLOGY

## 2023-08-21 PROCEDURE — 1160F PR REVIEW ALL MEDS BY PRESCRIBER/CLIN PHARMACIST DOCUMENTED: ICD-10-PCS | Mod: CPTII,S$GLB,, | Performed by: OBSTETRICS & GYNECOLOGY

## 2023-08-21 PROCEDURE — 76816 PR  US,PREGNANT UTERUS,F/U,TRANSABD APP: ICD-10-PCS | Mod: S$GLB,,, | Performed by: OBSTETRICS & GYNECOLOGY

## 2023-08-21 PROCEDURE — 3074F SYST BP LT 130 MM HG: CPT | Mod: CPTII,S$GLB,, | Performed by: OBSTETRICS & GYNECOLOGY

## 2023-08-21 PROCEDURE — 3074F PR MOST RECENT SYSTOLIC BLOOD PRESSURE < 130 MM HG: ICD-10-PCS | Mod: CPTII,S$GLB,, | Performed by: OBSTETRICS & GYNECOLOGY

## 2023-08-21 PROCEDURE — 76819 FETAL BIOPHYS PROFIL W/O NST: CPT | Mod: S$GLB,,, | Performed by: OBSTETRICS & GYNECOLOGY

## 2023-08-21 PROCEDURE — 1160F RVW MEDS BY RX/DR IN RCRD: CPT | Mod: CPTII,S$GLB,, | Performed by: OBSTETRICS & GYNECOLOGY

## 2023-08-21 PROCEDURE — 99213 PR OFFICE/OUTPT VISIT, EST, LEVL III, 20-29 MIN: ICD-10-PCS | Mod: 25,TH,S$GLB, | Performed by: OBSTETRICS & GYNECOLOGY

## 2023-08-21 PROCEDURE — 1159F MED LIST DOCD IN RCRD: CPT | Mod: CPTII,S$GLB,, | Performed by: OBSTETRICS & GYNECOLOGY

## 2023-08-21 PROCEDURE — 3078F PR MOST RECENT DIASTOLIC BLOOD PRESSURE < 80 MM HG: ICD-10-PCS | Mod: CPTII,S$GLB,, | Performed by: OBSTETRICS & GYNECOLOGY

## 2023-08-21 PROCEDURE — 3008F PR BODY MASS INDEX (BMI) DOCUMENTED: ICD-10-PCS | Mod: CPTII,S$GLB,, | Performed by: OBSTETRICS & GYNECOLOGY

## 2023-08-21 PROCEDURE — 76819 PR US, OB, FETAL BIOPHYSICAL, W/O NST: ICD-10-PCS | Mod: S$GLB,,, | Performed by: OBSTETRICS & GYNECOLOGY

## 2023-08-21 PROCEDURE — 76816 OB US FOLLOW-UP PER FETUS: CPT | Mod: S$GLB,,, | Performed by: OBSTETRICS & GYNECOLOGY

## 2023-08-21 PROCEDURE — 99213 OFFICE O/P EST LOW 20 MIN: CPT | Mod: 25,TH,S$GLB, | Performed by: OBSTETRICS & GYNECOLOGY

## 2023-08-21 PROCEDURE — 3008F BODY MASS INDEX DOCD: CPT | Mod: CPTII,S$GLB,, | Performed by: OBSTETRICS & GYNECOLOGY

## 2023-08-21 NOTE — PROGRESS NOTES
Maternal Fetal Medicine follow up consult    SUBJECTIVE:     Earlene Camilo is a 33 y.o.  female with IUP at 33w1d who is seen in follow up consultation by MFM.  Pregnancy complications include:   Problem   1. Prior poor obstetrical history in third trimester, antepartum   2. BMI affecting pregnancy in third trimester   3. Anxiety/depression during pregnancy in third trimester     Earlene is without complaints.   Recently passed 3hr OGTT.  Denies LOF, VB, contractions. Positive fetal movement.  She had significant GERD despite PPI and Pepcid use.  She states that she is unfortunately drinking a lot of orange juice.  She states that she is doing this because she feels like her sugar is low.  I advised her to eat something different when she is feeling hungry.  We discussed foods that make acid reflux worse.    Previous notes reviewed.   No changes to medical, surgical, family, social, or obstetric history.    Interval history since last M visit: no changes    Medications:  Current Outpatient Medications   Medication Instructions    aspirin (ECOTRIN) 81 mg, Oral, Daily, Started when she was 3 months pregnant     docusate sodium (COLACE) 100 mg, Oral, 2 times daily PRN    famotidine (PEPCID) 20 mg, Oral, Nightly PRN    fluticasone propionate (FLONASE) 50 mcg/actuation nasal spray fluticasone propionate Take 2 Spray(s) (nasal) 1 time per day for 30 days 2 sprays each nostril once daily. 2022 spray,suspension 1 time per day nasal 30 days active 50 mcg/actuation    lansoprazole (PREVACID) 30 mg, Oral, Daily    ondansetron (ZOFRAN) 8 mg, Oral, 3 times daily    PRENATAL VITAMIN 27 mg iron- 0.8 mg Tab 1 tablet, Oral    pyridoxine (vitamin B6) (VITAMIN B-6) 25 mg, Oral, Daily    sertraline (ZOLOFT) 50 MG tablet Take 0.5 tablets (25 mg total) by mouth every evening for 7 days, THEN 1 tablet (50 mg total) every evening.       Care team members:  [facility] - Primary OB       OBJECTIVE:   BP (!) 105/56 (BP Location: Right  arm)   Pulse 109   Wt 120.1 kg (264 lb 12.4 oz)   LMP  (LMP Unknown)   BMI 48.43 kg/m²     Ultrasound performed. See viewpoint for full ultrasound report.    A viable varner pregnancy is visualized in cephalic presentation.  Estimated fetal weight is at the 50th percentile with an abdominal circumference at the 79th percentile.    No fetal abnormalities are noted and anatomic survey is complete. Amniotic fluid volume is normal.  Placenta is anterior. Biophysical profile Is 8/8.     ASSESSMENT/PLAN:     33 y.o.  female with IUP at 33w1d    1. Prior poor obstetrical history in third trimester, antepartum  With her 1st pregnancy, she delivered at 18 weeks' gestation.  She reports that the baby was not growing properly and upon attending a routine OB visit, the baby no longer had a heartbeat.  She was sent to the OB unit for induction.  She denies any genetic testing being performed on the baby after delivery.  She is unsure if she had any APLS testing completed.  Of note, she had 3 successful pregnancies afterward.    With her 2nd pregnancy, she reports she went into labor at 20 weeks and a cerclage was placed at that time.  She was able to successfully carry to term.  In her 3rd and 4th pregnancies, she reports her physician monitoring her cervix and never needing cerclage placement.  She did not take progesterone for either pregnancy.  She delivered both of these pregnancies at term.    Cervical length ultrasounds were reassuring.    Recommendations:  -Precautions for labor given        2. BMI affecting pregnancy in third trimester  Please see prior documentation for specific counseling.      BMI 48    Recommendations:  Discussed that TWG goal is 11-20 lbs  Screen for signs/symptoms of obstructive sleep apnea  Nutritionist consult offered (this is to be ordered by primary OB provider)  Consider early 1 hr GCT (between 14-16 weeks gestation); repeat at 24-28 weeks gestation  Start low dose aspirin 81  mg daily at 12-16 weeks for preeclampsia risk reduction  Targeted anatomical survey scheduled at 18-20 weeks  Fetal growth ultrasound at 32 weeks if BMI >= 40  Weekly  testing at 32 weeks if pre-pregnancy BMI >= 45  Lovenox 40 mg BID for VTE prophylaxis while admitted to the hospital (antepartum or postpartum) if BMI >= 40.   Encouraged breastfeeding  Postpartum lifestyle modifications & weight loss      3. Anxiety/depression during pregnancy in third trimester  She reports a history of anxiety and depression. She is taking Zoloft 50mg daily. She reports improvement of symptoms with the utilization of this medication regimen. Discussed increased risk for peripartum and postpartum mood disorders. Precautions provided.      We discussed the usage of SSRIs in pregnancy and the minimal risks associated with the fetus.      We have discussed the importance of optimization of mental health conditions during pregnancy in an effort to reduce the risk of postpartum mood disorders. We have discussed options for management including psychotherapy, counseling, cognitive behavioral therapy, exercise/yoga, journaling, and psychiatry services. She will notify our office if she is interested in being referred for any of the above.        We have not scheduled any follow-up with [facility] at this time, but would be happy to see her again should any questions, concerns, or issues arise.     Lilly Cheatham MD  Maternal Fetal Medicine

## 2023-08-21 NOTE — ASSESSMENT & PLAN NOTE
Please see prior documentation for specific counseling.      BMI 48    Recommendations:   Discussed that TWG goal is 11-20 lbs   Screen for signs/symptoms of obstructive sleep apnea   Nutritionist consult offered (this is to be ordered by primary OB provider)   Consider early 1 hr GCT (between 14-16 weeks gestation); repeat at 24-28 weeks gestation   Start low dose aspirin 81 mg daily at 12-16 weeks for preeclampsia risk reduction   Targeted anatomical survey scheduled at 18-20 weeks   Fetal growth ultrasound at 32 weeks if BMI >= 40   Weekly  testing at 32 weeks if pre-pregnancy BMI >= 45   Lovenox 40 mg BID for VTE prophylaxis while admitted to the hospital (antepartum or postpartum) if BMI >= 40.    Encouraged breastfeeding   Postpartum lifestyle modifications & weight loss

## 2023-08-21 NOTE — ASSESSMENT & PLAN NOTE
With her 1st pregnancy, she delivered at 18 weeks' gestation.  She reports that the baby was not growing properly and upon attending a routine OB visit, the baby no longer had a heartbeat.  She was sent to the OB unit for induction.  She denies any genetic testing being performed on the baby after delivery.  She is unsure if she had any APLS testing completed.  Of note, she had 3 successful pregnancies afterward.    With her 2nd pregnancy, she reports she went into labor at 20 weeks and a cerclage was placed at that time.  She was able to successfully carry to term.  In her 3rd and 4th pregnancies, she reports her physician monitoring her cervix and never needing cerclage placement.  She did not take progesterone for either pregnancy.  She delivered both of these pregnancies at term.    Cervical length ultrasounds were reassuring.    Recommendations:  -Precautions for labor given

## 2023-08-23 ENCOUNTER — PATIENT OUTREACH (OUTPATIENT)
Dept: FAMILY MEDICINE | Facility: CLINIC | Age: 33
End: 2023-08-23
Payer: MEDICAID

## 2023-08-23 RX ORDER — PYRIDOXINE HCL (VITAMIN B6) 25 MG
25 TABLET ORAL DAILY
Qty: 30 TABLET | Refills: 0 | Status: SHIPPED | OUTPATIENT
Start: 2023-08-23 | End: 2023-09-19 | Stop reason: SDUPTHER

## 2023-08-23 NOTE — PROGRESS NOTES
Appointment Reminder: 8/29/2023  Follow-up: 9/6/2023    Appointment reminder given for NST 8/24/2023 0930        Patient verbalized understanding. Yes        Other comments: Identity verified.  Reminded patient of appointment with [facility] for Non stress test 8/24/2023 0930.  Patient verbalized understanding.   Patient denies abdominal pain, vaginal bleeding or leaking fluid, CP, SOB, HA, visual changes, contractions, but endorses positive fetal movement.  Pt states her ankles have some mild swelling that decreases when she elevates her feet.                    Education given on

## 2023-08-24 ENCOUNTER — CLINICAL SUPPORT (OUTPATIENT)
Dept: FAMILY MEDICINE | Facility: CLINIC | Age: 33
End: 2023-08-24
Payer: MEDICAID

## 2023-08-24 VITALS
HEIGHT: 62 IN | DIASTOLIC BLOOD PRESSURE: 71 MMHG | SYSTOLIC BLOOD PRESSURE: 104 MMHG | HEART RATE: 112 BPM | TEMPERATURE: 98 F | WEIGHT: 256.63 LBS | RESPIRATION RATE: 18 BRPM | OXYGEN SATURATION: 97 % | BODY MASS INDEX: 47.23 KG/M2

## 2023-08-24 DIAGNOSIS — Z3A.33 33 WEEKS GESTATION OF PREGNANCY: Primary | ICD-10-CM

## 2023-08-24 PROCEDURE — 99213 OFFICE O/P EST LOW 20 MIN: CPT | Mod: PBBFAC

## 2023-08-24 NOTE — PROGRESS NOTES
Pt placed on NST x30+ minutes. Strip shown to Dr. Dye; strip images sent to Dr. De Anda. NST reactive per Dr. Dye; ok to discharge patient home.

## 2023-08-29 ENCOUNTER — PATIENT OUTREACH (OUTPATIENT)
Dept: FAMILY MEDICINE | Facility: CLINIC | Age: 33
End: 2023-08-29
Payer: MEDICAID

## 2023-08-29 NOTE — PROGRESS NOTES
Follow-up: 9/6/2023    Appointment reminder given for: [facility] OB 8/31/2023 1045.        Patient verbalized understanding: Yes        Other comments: Identity verified.  Reminded patient of appointment with [facility] OB 8/31/2023 1045.  Patient verbalized understanding and states she is having issues with her sinuses and think she has a sinus infection.  Instructed to notify doctor at appt on 8/31/23.  Pt states the doctor is aware.                    Education given on

## 2023-08-29 NOTE — PROGRESS NOTES
FETAL ASSESSMENT REPORT    RE: Earlene Camilo  MRN:  [id number]  :  1990  AGE:  33 y.o.    Date:  2023    REFERRAL PHYSICIAN: [facility]    Allergies: Latex, natural rubber    Earlene is a 33 y.o.  at 34w2d gestational age here today for a NST.    10/8/2023, by Ultrasound    MEDICATIONS AT TIME OF TEST:    Current Outpatient Medications   Medication Sig Dispense Refill    aspirin (ECOTRIN) 81 MG EC tablet Take 81 mg by mouth once daily. Started when she was 3 months pregnant      docusate sodium (COLACE) 100 MG capsule Take 100 mg by mouth 2 (two) times daily as needed for Constipation.      famotidine (PEPCID) 20 MG tablet Take 1 tablet (20 mg total) by mouth nightly as needed for Heartburn. 30 tablet 2    fluticasone propionate (FLONASE) 50 mcg/actuation nasal spray fluticasone propionate Take 2 Spray(s) (nasal) 1 time per day for 30 days 2 sprays each nostril once daily. 2022 spray,suspension 1 time per day nasal 30 days active 50 mcg/actuation      lansoprazole (PREVACID) 30 MG capsule Take 1 capsule (30 mg total) by mouth once daily. 30 capsule 11    ondansetron (ZOFRAN) 8 MG tablet Take 8 mg by mouth 3 (three) times daily.      PRENATAL VITAMIN 27 mg iron- 0.8 mg Tab Take 1 tablet by mouth.      pyridoxine, vitamin B6, (VITAMIN B-6) 25 MG Tab Take 1 tablet (25 mg total) by mouth once daily. 30 tablet 0    sertraline (ZOLOFT) 50 MG tablet Take 0.5 tablets (25 mg total) by mouth every evening for 7 days, THEN 1 tablet (50 mg total) every evening. 90 tablet 0     No current facility-administered medications for this visit.       Indication: poor OB hx    Interpretation:  145 BPM baseline    Variability:  Good {> 6 bpm)    Accelerations:  Reactive    Decelerations:  none    Assessment: reactive    Plan:  NST scheduled, NST reactive      Daniel De Anda MD  2023; 11:08 AM     Late note entry, I was available and involved at the time of encounter.

## 2023-08-31 ENCOUNTER — OFFICE VISIT (OUTPATIENT)
Dept: FAMILY MEDICINE | Facility: CLINIC | Age: 33
End: 2023-08-31
Payer: MEDICAID

## 2023-08-31 VITALS
HEART RATE: 107 BPM | DIASTOLIC BLOOD PRESSURE: 74 MMHG | SYSTOLIC BLOOD PRESSURE: 109 MMHG | TEMPERATURE: 98 F | HEIGHT: 62 IN | BODY MASS INDEX: 46.93 KG/M2 | RESPIRATION RATE: 18 BRPM | OXYGEN SATURATION: 97 %

## 2023-08-31 DIAGNOSIS — Z3A.34 34 WEEKS GESTATION OF PREGNANCY: Primary | ICD-10-CM

## 2023-08-31 DIAGNOSIS — J30.1 SEASONAL ALLERGIC RHINITIS DUE TO POLLEN: ICD-10-CM

## 2023-08-31 DIAGNOSIS — K21.9 GASTROESOPHAGEAL REFLUX DISEASE WITHOUT ESOPHAGITIS: ICD-10-CM

## 2023-08-31 DIAGNOSIS — O99.213 OBESITY AFFECTING PREGNANCY IN THIRD TRIMESTER: ICD-10-CM

## 2023-08-31 DIAGNOSIS — O99.343 MENTAL DISORDER DURING PREGNANCY IN THIRD TRIMESTER: ICD-10-CM

## 2023-08-31 LAB
BILIRUB SERPL-MCNC: NEGATIVE MG/DL
BLOOD URINE, POC: NEGATIVE
CLARITY, POC UA: CLEAR
COLOR, POC UA: YELLOW
GLUCOSE UR QL STRIP: NEGATIVE
KETONES UR QL STRIP: NEGATIVE
LEUKOCYTE ESTERASE URINE, POC: NORMAL
NITRITE, POC UA: NEGATIVE
PH, POC UA: 7
PROTEIN, POC: NEGATIVE
SPECIFIC GRAVITY, POC UA: 1.02
UROBILINOGEN, POC UA: 1

## 2023-08-31 PROCEDURE — 81002 URINALYSIS NONAUTO W/O SCOPE: CPT | Mod: PBBFAC

## 2023-08-31 PROCEDURE — 99214 OFFICE O/P EST MOD 30 MIN: CPT | Mod: PBBFAC

## 2023-08-31 NOTE — PROGRESS NOTES
Subjective:      Patient ID: Earlene Camilo is a 33 y.o. female.    Chief Complaint:  Routine Prenatal Visit (OB 34w4d- routine PNV)      History of Present Illness  Earlene Camilo is a 33 y.o.  at 34w4d by first trimester ultrasound (CHERELLE Estimated Date of Delivery: 10/8/23) that presents to OB clinic    Current Issues:   Cough productive greenish sputum x 3 weeks taking mucinex, no significant improvement. Denies rhinorrhea, otalgia, SOB, hemoptysis.    Chronic issues:   Anxiety/depression- on Zoloft 50 mg daily   GERD/ N/V- on Prevacid 30 mg daily and Vit B6, started taking OTC pepcid as well  Obesity   Previous cerclage placement in 2nd pregnancy, no h/o progesterone use or no subsequent pre term deliveries, following MFM      GYN & OB History  No LMP recorded (lmp unknown). Patient is pregnant.   Date of Last Pap: 2023    OB History    Para Term  AB Living   5 3 3 0 1 3   SAB IAB Ectopic Multiple Live Births   1 0 0 0 3      # Outcome Date GA Lbr Jarret/2nd Weight Sex Delivery Anes PTL Lv   5 Current            4 Term 18 39w3d  3.317 kg (7 lb 5 oz) F Vag-Spont None Y DINA   3 Term 17 40w0d  3.946 kg (8 lb 11.2 oz) M Vag-Spont EPI N DINA   2 Term 10/04/15 41w5d  3.402 kg (7 lb 8 oz) M Vag-Spont EPI Y DINA   1 SAB 13 18w0d   F    ND       Review of Systems  - Fetal movements: Yes   - Vaginal bleeding: No  - Vaginal discharge: No  - Loss of fluid: No  - Contractions: No  - Headaches: No  - Vision changes: No  - Edema: Occasional       Objective:       Vitals:    23 1045   BP: 109/74   Pulse: 107   Resp: 18   Temp: 98.3 °F (36.8 °C)       RESP: clear to auscultation bilaterally, non labored  CV: regular rate and rhythm, no murmurs, no edema  ABD: gravid, nontender, BS+   FHTs: 140s bpm on NST  FH: 36 cm due to body habitus    Initial OB Labs:    - Blood Type and Rh: A positive  - Antibody Screen: Negative  - CBC H/H: 11.  - HIV: Nonreactive  - RPR: Non reactive  - GC:  Not Detected  - CT: Not Detected  - HBsAg: Nonreactive  - HCVAb: Nonreactive  - Rubella: Immune  - Varicella: Immune  - Sickle Cell Screen: Negative  - PAP: NIL    15-20 Weeks:   - Quad Screen: Normal Risk    28 weeks lab:  - 1H GTT: failed 1 hour, passed 3 hour  - Rhogam: not indicated  - Date of Tdap:   - CBC H/H: 11.9/36.5  - Syphilis Ab: Non reactive  - BTL consent: signed 7/28/23    Assessment:     1. 34 weeks gestation of pregnancy    2. Immunization due    3. 2. BMI affecting pregnancy in third trimester    4. 3. Anxiety/depression during pregnancy in third trimester    5. Gastroesophageal reflux disease without esophagitis    6. Seasonal allergic rhinitis due to pollen                Plan:     POCT urine dipstick without microscope  -     OB Protocol   -     PNVs, ASA  -     Urine dip reviewed as above  -     Routine labs reviewed  -     Continue Zoloft, Prevacid, pepcid        -     Mother plans to breastfeed        -     Postpartum contraception discussion: BTL signed 7/28/23         -    Labor precautions discussed in depth         -    Keep follow up with MFM         -    NST reactive

## 2023-08-31 NOTE — PROGRESS NOTES
FETAL ASSESSMENT REPORT    RE: Earlene Camilo  MRN:  [id number]  :  1990  AGE:  33 y.o.    Date:  2023    REFERRAL PHYSICIAN: [facility]    Allergies: Latex, natural rubber    Earlene is a 33 y.o.  at 34w4d gestational age here today for a NST.    10/8/2023, by Ultrasound    MEDICATIONS AT TIME OF TEST:    Current Outpatient Medications   Medication Sig Dispense Refill    aspirin (ECOTRIN) 81 MG EC tablet Take 81 mg by mouth once daily. Started when she was 3 months pregnant      docusate sodium (COLACE) 100 MG capsule Take 100 mg by mouth 2 (two) times daily as needed for Constipation.      famotidine (PEPCID) 20 MG tablet Take 1 tablet (20 mg total) by mouth nightly as needed for Heartburn. 30 tablet 2    fluticasone propionate (FLONASE) 50 mcg/actuation nasal spray fluticasone propionate Take 2 Spray(s) (nasal) 1 time per day for 30 days 2 sprays each nostril once daily. 2022 spray,suspension 1 time per day nasal 30 days active 50 mcg/actuation      lansoprazole (PREVACID) 30 MG capsule Take 1 capsule (30 mg total) by mouth once daily. 30 capsule 11    ondansetron (ZOFRAN) 8 MG tablet Take 8 mg by mouth 3 (three) times daily.      PRENATAL VITAMIN 27 mg iron- 0.8 mg Tab Take 1 tablet by mouth.      pyridoxine, vitamin B6, (VITAMIN B-6) 25 MG Tab Take 1 tablet (25 mg total) by mouth once daily. 30 tablet 0    sertraline (ZOLOFT) 50 MG tablet Take 0.5 tablets (25 mg total) by mouth every evening for 7 days, THEN 1 tablet (50 mg total) every evening. 90 tablet 0     No current facility-administered medications for this visit.       Indication: poor OB hx    Interpretation:  140 BPM baseline    Variability:  Good {> 6 bpm)    Accelerations:  Reactive    Decelerations:  none    Assessment: reactive    Plan:  NST scheduled, NST reactive      Daniel De Anda MD  2023; 12:12 PM

## 2023-09-06 ENCOUNTER — PATIENT OUTREACH (OUTPATIENT)
Dept: FAMILY MEDICINE | Facility: CLINIC | Age: 33
End: 2023-09-06
Payer: MEDICAID

## 2023-09-06 NOTE — PROGRESS NOTES
Follow-up: 10/4/2023    Appointment reminder given for [facility] 9/7/2023 6342        Patient verbalized understanding: Yes        Other comments:  Identity verified.  Pt states she has been having a productive cough for 3 weeks with green sputum.  She states she also has a sinus drip.  She states she has been wheezing and is taking Mucinex and nebulizer treatments without relief.  Pt states she has notified her OB doctor, but nothing has been prescribed.  Pt states she has yellow vaginal discharge.  Instructed to notify OB doctor at tomorrow's appt.   Patient denies abdominal pain, vaginal bleeding or leaking fluid, CP, SOB, HA, visual changes, contractions, but endorses positive fetal movement.  Pt states she has edema of her hands feet and ankles, but it is not worse than usual.  Instructed patient if she has any of the above symptoms to report to the ED for evaluation.  Pt had 3 OB appts last month,  NST X 2 and a M[facility] F[facility] Medicine Clinic appt.  Stressed the importance of medication compliance, drinking plenty of water, eating a healthy diet and keeping appointments.   Patient verbalized understanding to all instructions.                       Education given on: See above

## 2023-09-07 ENCOUNTER — OFFICE VISIT (OUTPATIENT)
Dept: FAMILY MEDICINE | Facility: CLINIC | Age: 33
End: 2023-09-07
Payer: MEDICAID

## 2023-09-07 VITALS
OXYGEN SATURATION: 97 % | WEIGHT: 266.75 LBS | RESPIRATION RATE: 20 BRPM | HEART RATE: 119 BPM | SYSTOLIC BLOOD PRESSURE: 118 MMHG | DIASTOLIC BLOOD PRESSURE: 82 MMHG | BODY MASS INDEX: 49.09 KG/M2 | HEIGHT: 62 IN

## 2023-09-07 DIAGNOSIS — O09.293 PRIOR POOR OBSTETRICAL HISTORY IN THIRD TRIMESTER, ANTEPARTUM: Primary | ICD-10-CM

## 2023-09-07 DIAGNOSIS — J06.9 UPPER RESPIRATORY TRACT INFECTION, UNSPECIFIED TYPE: ICD-10-CM

## 2023-09-07 DIAGNOSIS — J30.1 SEASONAL ALLERGIC RHINITIS DUE TO POLLEN: ICD-10-CM

## 2023-09-07 DIAGNOSIS — Z3A.35 35 WEEKS GESTATION OF PREGNANCY: ICD-10-CM

## 2023-09-07 DIAGNOSIS — O99.213 OBESITY AFFECTING PREGNANCY IN THIRD TRIMESTER: ICD-10-CM

## 2023-09-07 DIAGNOSIS — O99.343 MENTAL DISORDER DURING PREGNANCY IN THIRD TRIMESTER: ICD-10-CM

## 2023-09-07 DIAGNOSIS — K21.9 GASTROESOPHAGEAL REFLUX DISEASE WITHOUT ESOPHAGITIS: ICD-10-CM

## 2023-09-07 LAB
BILIRUB SERPL-MCNC: NORMAL MG/DL
BLOOD URINE, POC: NORMAL
CLARITY, POC UA: NORMAL
COLOR, POC UA: NORMAL
GLUCOSE UR QL STRIP: NORMAL
KETONES UR QL STRIP: NORMAL
LEUKOCYTE ESTERASE URINE, POC: NORMAL
NITRITE, POC UA: NORMAL
PH, POC UA: 6
PROTEIN, POC: NORMAL
SPECIFIC GRAVITY, POC UA: 1.03
UROBILINOGEN, POC UA: 1

## 2023-09-07 PROCEDURE — 81002 URINALYSIS NONAUTO W/O SCOPE: CPT | Mod: PBBFAC

## 2023-09-07 PROCEDURE — 99213 OFFICE O/P EST LOW 20 MIN: CPT | Mod: PBBFAC

## 2023-09-07 RX ORDER — AZITHROMYCIN 250 MG/1
TABLET, FILM COATED ORAL
Qty: 6 TABLET | Refills: 0 | Status: SHIPPED | OUTPATIENT
Start: 2023-09-07 | End: 2023-09-12

## 2023-09-07 NOTE — PROGRESS NOTES
FETAL ASSESSMENT REPORT    RE: Earlene Camilo  MRN:  [id number]  :  1990  AGE:  33 y.o.    Date:  2023    REFERRAL PHYSICIAN: [facility]    Allergies: Latex, natural rubber    Earlene is a 33 y.o.  at 35w4d gestational age here today for a NST.    10/8/2023, by Ultrasound    MEDICATIONS AT TIME OF TEST:    Current Outpatient Medications   Medication Sig Dispense Refill    aspirin (ECOTRIN) 81 MG EC tablet Take 81 mg by mouth once daily. Started when she was 3 months pregnant      azithromycin (Z-ANGELICA) 250 MG tablet Take 2 tablets by mouth on day 1; Take 1 tablet by mouth on days 2-5 6 tablet 0    docusate sodium (COLACE) 100 MG capsule Take 100 mg by mouth 2 (two) times daily as needed for Constipation.      famotidine (PEPCID) 20 MG tablet Take 1 tablet (20 mg total) by mouth nightly as needed for Heartburn. 30 tablet 2    fluticasone propionate (FLONASE) 50 mcg/actuation nasal spray fluticasone propionate Take 2 Spray(s) (nasal) 1 time per day for 30 days 2 sprays each nostril once daily. 2022 spray,suspension 1 time per day nasal 30 days active 50 mcg/actuation      lansoprazole (PREVACID) 30 MG capsule Take 1 capsule (30 mg total) by mouth once daily. 30 capsule 11    ondansetron (ZOFRAN) 8 MG tablet Take 8 mg by mouth 3 (three) times daily.      PRENATAL VITAMIN 27 mg iron- 0.8 mg Tab Take 1 tablet by mouth.      pyridoxine, vitamin B6, (VITAMIN B-6) 25 MG Tab Take 1 tablet (25 mg total) by mouth once daily. 30 tablet 0    sertraline (ZOLOFT) 50 MG tablet Take 0.5 tablets (25 mg total) by mouth every evening for 7 days, THEN 1 tablet (50 mg total) every evening. 90 tablet 0     No current facility-administered medications for this visit.       Indication: poor ob Hx    Interpretation:  150 BPM baseline    Variability:  Good {> 6 bpm)    Accelerations:  Reactive    Decelerations:  none    Assessment: reactive    Plan:  NST scheduled, NST reactive      Daniel De Anda  MD  9/7/2023; 12:57 PM

## 2023-09-07 NOTE — PROGRESS NOTES
Subjective:      Patient ID: Earlene Camilo is a 33 y.o. female.    Chief Complaint:  Routine Prenatal Visit ([facility]  c/o cough and sinus congestion x 3 weeks, bilateral hip pain.)      History of Present Illness  Still having occasional back and hip pain. Still coughing green and yellow mucous.    GYN & OB History  No LMP recorded (lmp unknown). Patient is pregnant.   Date of Last Pap: 2023    OB History    Para Term  AB Living   5 3 3 0 1 3   SAB IAB Ectopic Multiple Live Births   1 0 0 0 3      # Outcome Date GA Lbr Jarret/2nd Weight Sex Delivery Anes PTL Lv   5 Current            4 Term 18 39w3d  3.317 kg (7 lb 5 oz) F Vag-Spont None Y DINA   3 Term 17 40w0d  3.946 kg (8 lb 11.2 oz) M Vag-Spont EPI N DINA   2 Term 10/04/15 41w5d  3.402 kg (7 lb 8 oz) M Vag-Spont EPI Y DINA   1 SAB 13 18w0d   F    ND       Review of Systems  - Fetal movements: Yes   - Vaginal bleeding: No  - Vaginal discharge: No  - Loss of fluid: No  - Contractions: No  - Headaches: No  - Vision changes: No  - Edema: Occasional       Objective:     Physical Exam   Vitals:    23 1013   BP: 118/82   Pulse: (!) 119   Resp: 20   RESP: clear to auscultation bilaterally, non labored  CV: regular rate and rhythm, no murmurs, no edema, pulse 93 on exam  ABD: gravid, nontender, BS+   FHTs: 150s bpm on NST       Initial OB Labs:    - Blood Type and Rh: A positive  - Antibody Screen: Negative  - CBC H/H: 11.36  - HIV: Nonreactive  - RPR: Non reactive  - GC: Not Detected  - CT: Not Detected  - HBsAg: Nonreactive  - HCVAb: Nonreactive  - Rubella: Immune  - Varicella: Immune  - Sickle Cell Screen: Negative  - PAP: NIL    15-20 Weeks:   - Quad Screen: Normal Risk     28 weeks lab:  - 1H GTT: failed 1 hour, passed 3 hour  - Rhogam: not indicated  - Date of Tdap: Unable to administer due to latex allergy  - CBC H/H: 11.9/36.5  - Syphilis Ab: Non reactive  - BTL consent: signed 23     Assessment:     1. 1. Prior poor  obstetrical history in third trimester, antepartum    2. 2. BMI affecting pregnancy in third trimester    3. Seasonal allergic rhinitis due to pollen    4. 3. Anxiety/depression during pregnancy in third trimester    5. Gastroesophageal reflux disease without esophagitis    6. 35 weeks gestation of pregnancy    7. Upper respiratory tract infection, unspecified type              Plan:   -     OB Protocol   -     PNVs, ASA  -     Urine dip reviewed   -     Routine labs reviewed  -     Continue Zoloft, Prevacid, pepcid        -     Mother plans to breastfeed        -     Postpartum contraception discussion: BTL signed 7/28/23         -    Labor precautions discussed in depth         -    Zpack given for persistent URI symptoms         -    NST today

## 2023-09-13 RX ORDER — SERTRALINE HYDROCHLORIDE 50 MG/1
TABLET, FILM COATED ORAL
Qty: 90 TABLET | Refills: 0 | Status: SHIPPED | OUTPATIENT
Start: 2023-09-13 | End: 2023-09-19 | Stop reason: SDUPTHER

## 2023-09-14 ENCOUNTER — HOSPITAL ENCOUNTER (EMERGENCY)
Facility: HOSPITAL | Age: 33
Discharge: HOME | End: 2023-09-14
Payer: MEDICAID

## 2023-09-14 ENCOUNTER — OFFICE VISIT (OUTPATIENT)
Dept: FAMILY MEDICINE | Facility: CLINIC | Age: 33
End: 2023-09-14
Payer: MEDICAID

## 2023-09-14 VITALS
HEIGHT: 62 IN | HEART RATE: 114 BPM | SYSTOLIC BLOOD PRESSURE: 104 MMHG | WEIGHT: 266.38 LBS | RESPIRATION RATE: 18 BRPM | OXYGEN SATURATION: 96 % | BODY MASS INDEX: 49.02 KG/M2 | DIASTOLIC BLOOD PRESSURE: 74 MMHG | TEMPERATURE: 98 F

## 2023-09-14 VITALS
DIASTOLIC BLOOD PRESSURE: 60 MMHG | TEMPERATURE: 98 F | OXYGEN SATURATION: 97 % | HEART RATE: 97 BPM | SYSTOLIC BLOOD PRESSURE: 96 MMHG

## 2023-09-14 DIAGNOSIS — O99.213 OBESITY AFFECTING PREGNANCY IN THIRD TRIMESTER: ICD-10-CM

## 2023-09-14 DIAGNOSIS — O99.343 MENTAL DISORDER DURING PREGNANCY IN THIRD TRIMESTER: ICD-10-CM

## 2023-09-14 DIAGNOSIS — Z3A.36 36 WEEKS GESTATION OF PREGNANCY: Primary | ICD-10-CM

## 2023-09-14 DIAGNOSIS — O28.8 NST (NON-STRESS TEST) NONREACTIVE: ICD-10-CM

## 2023-09-14 DIAGNOSIS — J06.9 UPPER RESPIRATORY TRACT INFECTION, UNSPECIFIED TYPE: ICD-10-CM

## 2023-09-14 DIAGNOSIS — O09.293 PRIOR POOR OBSTETRICAL HISTORY IN THIRD TRIMESTER, ANTEPARTUM: ICD-10-CM

## 2023-09-14 DIAGNOSIS — O35.9XX0 FETAL ABNORMALITY AFFECTING MANAGEMENT OF MOTHER, SINGLE OR UNSPECIFIED FETUS: Primary | ICD-10-CM

## 2023-09-14 DIAGNOSIS — J30.1 SEASONAL ALLERGIC RHINITIS DUE TO POLLEN: ICD-10-CM

## 2023-09-14 DIAGNOSIS — K21.9 GASTROESOPHAGEAL REFLUX DISEASE WITHOUT ESOPHAGITIS: ICD-10-CM

## 2023-09-14 LAB
BASOPHILS # BLD AUTO: 0.03 X10(3)/MCL
BASOPHILS NFR BLD AUTO: 0.3 %
BILIRUB SERPL-MCNC: NORMAL MG/DL
BLOOD URINE, POC: NORMAL
C TRACH DNA SPEC QL NAA+PROBE: NOT DETECTED
CLARITY, POC UA: NORMAL
COLOR, POC UA: NORMAL
EOSINOPHIL # BLD AUTO: 0.09 X10(3)/MCL (ref 0–0.9)
EOSINOPHIL NFR BLD AUTO: 0.9 %
ERYTHROCYTE [DISTWIDTH] IN BLOOD BY AUTOMATED COUNT: 15.2 % (ref 11.5–17)
GLUCOSE UR QL STRIP: NORMAL
HCT VFR BLD AUTO: 39.8 % (ref 37–47)
HGB BLD-MCNC: 13.6 G/DL (ref 12–16)
HIV 1+2 AB+HIV1 P24 AG SERPL QL IA: NONREACTIVE
IMM GRANULOCYTES # BLD AUTO: 0.03 X10(3)/MCL (ref 0–0.04)
IMM GRANULOCYTES NFR BLD AUTO: 0.3 %
KETONES UR QL STRIP: NORMAL
LEUKOCYTE ESTERASE URINE, POC: NORMAL
LYMPHOCYTES # BLD AUTO: 2.03 X10(3)/MCL (ref 0.6–4.6)
LYMPHOCYTES NFR BLD AUTO: 21.2 %
MCH RBC QN AUTO: 29.1 PG (ref 27–31)
MCHC RBC AUTO-ENTMCNC: 34.2 G/DL (ref 33–36)
MCV RBC AUTO: 85 FL (ref 80–94)
MONOCYTES # BLD AUTO: 0.6 X10(3)/MCL (ref 0.1–1.3)
MONOCYTES NFR BLD AUTO: 6.3 %
N GONORRHOEA DNA SPEC QL NAA+PROBE: NOT DETECTED
NEUTROPHILS # BLD AUTO: 6.79 X10(3)/MCL (ref 2.1–9.2)
NEUTROPHILS NFR BLD AUTO: 71 %
NITRITE, POC UA: NORMAL
NRBC BLD AUTO-RTO: 0 %
PH, POC UA: 5.5
PLATELET # BLD AUTO: 285 X10(3)/MCL (ref 130–400)
PMV BLD AUTO: 10.6 FL (ref 7.4–10.4)
PROTEIN, POC: NORMAL
RBC # BLD AUTO: 4.68 X10(6)/MCL (ref 4.2–5.4)
SOURCE (OHS): NORMAL
SPECIFIC GRAVITY, POC UA: 1.03
T PALLIDUM AB SER QL: NONREACTIVE
UROBILINOGEN, POC UA: 0.2
WBC # SPEC AUTO: 9.57 X10(3)/MCL (ref 4.5–11.5)

## 2023-09-14 PROCEDURE — 36415 COLL VENOUS BLD VENIPUNCTURE: CPT

## 2023-09-14 PROCEDURE — 87591 N.GONORRHOEAE DNA AMP PROB: CPT

## 2023-09-14 PROCEDURE — 87389 HIV-1 AG W/HIV-1&-2 AB AG IA: CPT

## 2023-09-14 PROCEDURE — 81002 URINALYSIS NONAUTO W/O SCOPE: CPT | Mod: PBBFAC

## 2023-09-14 PROCEDURE — 86780 TREPONEMA PALLIDUM: CPT

## 2023-09-14 PROCEDURE — 87081 CULTURE SCREEN ONLY: CPT

## 2023-09-14 PROCEDURE — 99214 OFFICE O/P EST MOD 30 MIN: CPT | Mod: PBBFAC

## 2023-09-14 PROCEDURE — 99284 EMERGENCY DEPT VISIT MOD MDM: CPT | Mod: 25,27

## 2023-09-14 PROCEDURE — 85025 COMPLETE CBC W/AUTO DIFF WBC: CPT

## 2023-09-14 NOTE — ED PROVIDER NOTES
Encounter Date: 2023       History     Chief Complaint   Patient presents with    non reactive NST     Non reactive NST in Dr office. Reports positive fetal movement. Denies any bleeding or leaking of fluid     HPI  Review of patient's allergies indicates:   Allergen Reactions    Latex, natural rubber Rash     Past Medical History:   Diagnosis Date    Anemia     Mental disorder     Postpartum depression      Past Surgical History:   Procedure Laterality Date    ORIF, FRACTURE, TIBIAL TUBEROSITY Left      Family History   Problem Relation Age of Onset    Heart attack Maternal Grandfather     Hypertension Father     Miscarriages / Stillbirths Sister      Social History     Tobacco Use    Smoking status: Former     Current packs/day: 0.00     Types: Cigarettes     Quit date: 2023     Years since quittin.6     Passive exposure: Never    Smokeless tobacco: Former   Substance Use Topics    Alcohol use: Not Currently    Drug use: Not Currently     Types: Marijuana     Comment: last use 2023     Review of Systems    Physical Exam     Initial Vitals [23 1239]   BP Pulse Resp Temp SpO2   -- -- -- 98.2 °F (36.8 °C) --      MAP       --         Physical Exam    ED Course   Procedures  Labs Reviewed - No data to display       Imaging Results              US OB 14+ Weeks TransAbd, w/Biophysical Profile, w/o NST, Single Gestation (xpd) (In process)                      Medications - No data to display  Medical Decision Making  Amount and/or Complexity of Data Reviewed  Radiology: ordered.                               Clinical Impression:   This  @ 36 4/7 weeks gestation   Sent from [facility] for a non-reactive NST  She reports +FM, no LOF, no UB, no CTX    Tracing: reactive  VE: NA    A/P: Fetal well-being  -NST/BPP  -patient reassured  -discharged home with pain, bleeding, and movement precautions            Erwin Doe MD  23 [id number]

## 2023-09-14 NOTE — PROGRESS NOTES
FETAL ASSESSMENT REPORT    RE: Earlene Camilo  MRN:  [id number]  :  1990  AGE:  33 y.o.    Date:  2023    REFERRAL PHYSICIAN: [facility]    Allergies: Latex, natural rubber    Earlene is a 33 y.o.  at 36w4d gestational age here today for a NST.    10/8/2023, by Ultrasound    MEDICATIONS AT TIME OF TEST:    Current Outpatient Medications   Medication Sig Dispense Refill    aspirin (ECOTRIN) 81 MG EC tablet Take 81 mg by mouth once daily. Started when she was 3 months pregnant      docusate sodium (COLACE) 100 MG capsule Take 100 mg by mouth 2 (two) times daily as needed for Constipation.      famotidine (PEPCID) 20 MG tablet Take 1 tablet (20 mg total) by mouth nightly as needed for Heartburn. 30 tablet 2    fluticasone propionate (FLONASE) 50 mcg/actuation nasal spray fluticasone propionate Take 2 Spray(s) (nasal) 1 time per day for 30 days 2 sprays each nostril once daily. 2022 spray,suspension 1 time per day nasal 30 days active 50 mcg/actuation      lansoprazole (PREVACID) 30 MG capsule Take 1 capsule (30 mg total) by mouth once daily. 30 capsule 11    ondansetron (ZOFRAN) 8 MG tablet Take 8 mg by mouth 3 (three) times daily.      PRENATAL VITAMIN 27 mg iron- 0.8 mg Tab Take 1 tablet by mouth.      pyridoxine, vitamin B6, (VITAMIN B-6) 25 MG Tab Take 1 tablet (25 mg total) by mouth once daily. 30 tablet 0    sertraline (ZOLOFT) 50 MG tablet Take 1 tablet (50 mg total) by mouth every evening for 83 days, THEN 1 tablet (50 mg total) every evening. 90 tablet 0     No current facility-administered medications for this visit.       Indication: poor ob Hx    Interpretation:  150 BPM baseline    Variability:  Good {> 6 bpm)    Accelerations:  Non-reactive     Decelerations:  none    Assessment: nonreactive    Plan:  biophysical profile ordered @ pako De Anda MD  2023; 1:27 PM

## 2023-09-14 NOTE — PROGRESS NOTES
OB Office Visit Note    Name: Earlene Camilo  MRN: [id number]  Date: 2023    Subjective:      Chief Complaint: Routine Prenatal Visit (HROB 36w4d. No complaints.)      Earlene Camilo is a 33 y.o.  at 36w4d with CHERELLE 10/8/2023, by 1st trimester ultrasound presents to OB clinic for routine visit.    Current issues: at previous OB visit, was diagnosed with URI and prescribed course of Azithromycin. States she has one more day of treatment to complete. Cough still present. Denies fever/chills/nausea/vomiting. Complains of back and chest soreness/tightness associated with cough. Still having occasional hip pain, relieved with stretching. Does not take OTC pain relief meds. Additionally, reports that she thinks she has a cut on her vulva 2/2 shaving; is experiencing some associated burning pain in the area.    Chronic issues:   Anxiety/depression - mood stable; compliant with Zoloft 50 mg daily   GERD/ N/V- takes Prevacid 30 mg daily and Vit B6, taking OTC pepcid as well  Obesity   Previous cerclage placement in 2nd pregnancy, no h/o progesterone use or no subsequent pre-term deliveries, following MFM      PMHx:  see above  PSHx:  no h/o previous c/s or abdominal surgeries reported  SH: support at home and no tobacco use  FHx: No reported pertinent FHx  Meds:   Prior to Admission medications    Medication Sig Start Date End Date Taking? Authorizing Provider   aspirin (ECOTRIN) 81 MG EC tablet Take 81 mg by mouth once daily. Started when she was 3 months pregnant    Provider, Historical   docusate sodium (COLACE) 100 MG capsule Take 100 mg by mouth 2 (two) times daily as needed for Constipation.    Provider, Historical   famotidine (PEPCID) 20 MG tablet Take 1 tablet (20 mg total) by mouth nightly as needed for Heartburn. 23  Odalys Dye MD   fluticasone propionate (FLONASE) 50 mcg/actuation nasal spray fluticasone propionate Take 2 Spray(s) (nasal) 1 time per day for 30 days 2 sprays each  nostril once daily. 2022 spray,suspension 1 time per day nasal 30 days active 50 mcg/actuation 22   Provider, Historical   lansoprazole (PREVACID) 30 MG capsule Take 1 capsule (30 mg total) by mouth once daily. 3/30/23 3/29/24  Jasmine Orozco MD   ondansetron (ZOFRAN) 8 MG tablet Take 8 mg by mouth 3 (three) times daily. 3/29/23   Provider, Historical   PRENATAL VITAMIN 27 mg iron- 0.8 mg Tab Take 1 tablet by mouth. 3/30/23   Provider, Historical   pyridoxine, vitamin B6, (VITAMIN B-6) 25 MG Tab Take 1 tablet (25 mg total) by mouth once daily. 23   Odalys Dye MD   sertraline (ZOLOFT) 50 MG tablet Take 1 tablet (50 mg total) by mouth every evening for 83 days, THEN 1 tablet (50 mg total) every evening. 23  Celio Cowan MD     Allergies:   Review of patient's allergies indicates:   Allergen Reactions    Latex, natural rubber Rash       Gestational History:   OB History    Para Term  AB Living   5 3 3 0 1 3   SAB IAB Ectopic Multiple Live Births   1 0 0 0 3      # Outcome Date GA Lbr Jarret/2nd Weight Sex Delivery Anes PTL Lv   5 Current            4 Term 18 39w3d  3.317 kg (7 lb 5 oz) F Vag-Spont None Y DINA   3 Term 17 40w0d  3.946 kg (8 lb 11.2 oz) M Vag-Spont EPI N DINA   2 Term 10/04/15 41w5d  3.402 kg (7 lb 8 oz) M Vag-Spont EPI Y DINA   1 SAB 13 18w0d   F    ND       GYNHx:   LMP: No LMP recorded (lmp unknown). Patient is pregnant.   Last pap: 2023      Antepartum specific ROS  - Fetal movements: Yes   - Vaginal bleeding: No  - Vaginal discharge: No  - Loss of fluid: No  - Contractions: rare; nonpainful, irregular  - Headaches: relieved with ice pack  - Vision changes: No  - Edema: +LE edema    Review of Systems  Constitutional: no fever, no chills  CV: chest tightness/soreness  RESP: no SOB; + persistent cough  : no dysuria, no hematuria  GI: no constipation, no diarrhea, no nausea, no vomiting  Psych: mood stable - no depression, no  "anxiety; No SI/HI    Objective:      Vitals:    09/14/23 1049   BP: 104/74   BP Location: Right arm   Patient Position: Lying   BP Method: Large (Automatic)   Pulse: (!) 114   Resp: 18   Temp: 97.7 °F (36.5 °C)   TempSrc: Oral   SpO2: 96%   Weight: 120.8 kg (266 lb 6.4 oz)   Height: 5' 2" (1.575 m)       Lab Results   Component Value Date    COLORU Dark Yellow 09/14/2023    SPECGRAV 1.030 09/14/2023    PHUR 5.5 09/14/2023    WBCUR neg 09/14/2023    NITRITE neg 09/14/2023    PROTEINPOC trace 09/14/2023    GLUCOSEUR neg 09/14/2023    KETONESU trace 09/14/2023    UROBILINOGEN 0.2 09/14/2023    BILIRUBINPOC small 09/14/2023    RBCUR neg 09/14/2023       General: alert, NAD  RESP: clear to auscultation bilaterally, non labored. Occasional nonproductive cough throughout exam  CV: tachycardia, regular rhythm, no murmurs, minimal LE edema  ABD: gravid, nontender, BS+   Fundal height: 39 cm  : normal appearing external female genitalia with 1-2mm abrasion to left labia majora; no surrounding erythema, no bleeding or discharge  Cervix: no lesions, no cervical motion tenderness; consistency firm, closed    NST in clinic today: nonreactive NST with FHR of 150s baseline      Initial OB Labs:    - Blood Type and Rh: A positive  - Antibody Screen: Negative  - CBC H/H: 11.5/36  - HIV: Nonreactive  - RPR: Non reactive  - GC: Not Detected  - CT: Not Detected  - HBsAg: Nonreactive  - HCVAb: Nonreactive  - Rubella: Immune  - Varicella: Immune  - Sickle Cell Screen: Negative  - PAP: NIL    15-20 Weeks:   - Quad Screen: Normal Risk     28 weeks lab:  - 1H GTT: failed 1 hour, passed 3 hour  - Rhogam: not indicated  - Date of Tdap: Unable to administer due to latex allergy  - CBC H/H: 11.9/36.5  - Syphilis Ab: Non reactive  - BTL consent: signed 7/28/23    36 Week Lab: Ordered 9/14/23  - CBC H/H: pending  - RPR: pending  - GBS Culture: pending  - HIV: pending  - Cervical GC: pending    Urine dip:  Lab Results   Component Value Date    " COLORU Dark Yellow 09/14/2023    SPECGRAV 1.030 09/14/2023    PHUR 5.5 09/14/2023    WBCUR neg 09/14/2023    NITRITE neg 09/14/2023    PROTEINPOC trace 09/14/2023    GLUCOSEUR neg 09/14/2023    KETONESU trace 09/14/2023    UROBILINOGEN 0.2 09/14/2023    BILIRUBINPOC small 09/14/2023    RBCUR neg 09/14/2023     Assessment/Plan:     Earlene was seen today for routine prenatal visit.    Diagnoses and all orders for this visit:    36 weeks gestation of pregnancy  -     POCT urine dipstick without microscope  -     CBC Auto Differential; Future  -     SYPHILIS ANTIBODY (WITH REFLEX RPR); Future  -     HIV 1/2 Ag/Ab (4th Gen); Future  -     Chlamydia/GC, PCR  -     Strep Only Culture  -     CBC Auto Differential  -     HIV 1/2 Ag/Ab (4th Gen)  -     SYPHILIS ANTIBODY (WITH REFLEX RPR)    Prior poor obstetrical history in third trimester, antepartum    BMI affecting pregnancy in third trimester    Seasonal allergic rhinitis due to pollen    Anxiety/depression during pregnancy in third trimester    Gastroesophageal reflux disease without esophagitis    Upper respiratory tract infection, unspecified type    NST (non-stress test) nonreactive         Plan  - Complete course of azithromycin  - Continue Zoloft; mood stable  - Continue Prevacid and Pepcid  -     PNVs, ASA  -     Urine dip reviewed as above  -     Routine (initial) labs: reviewed. 36 week labs/swabs collected today.  -     Mother plans to breastfeed  -     Postpartum contraception discussion: BTL signed 7/28/23  -     Labor precautions discussed. ED precautions discussed in depth: vaginal bleeding or leaking fluid, belly cramping or pain, SOB/chest pain, swelling of the face/lower extremities, vision changes. If don't feel the baby move in over an hour. Severe headache that are not resolved with medication  - Nonreactive NST in clinic today - Dr. De Anda instructed to present to hospital for BPP; hospital notified    Follow up in about 1 week (around  9/21/2023).      Pily Rodriguez MD  [facility], [facility]

## 2023-09-16 LAB — BACTERIA SPEC CULT: NORMAL

## 2023-09-17 NOTE — PROGRESS NOTES
I have personally reviewed the review of systems (ROS) and past, family and social histories (PFSH) documented above by the resident.  I have reviewed the care furnished by the resident during the encounter, including a review of the patient's medical history, the resident's findings on physical examination, diagnosis, and the treatment plan.  I participated in the management of the patient and was immediately available throughout the encounter.   I was physically present during all key portions of the service(s) provided with the resident.  Services were furnished in a primary care center located in the outpatient department of a [facility].

## 2023-09-19 ENCOUNTER — OFFICE VISIT (OUTPATIENT)
Dept: FAMILY MEDICINE | Facility: CLINIC | Age: 33
End: 2023-09-19
Payer: MEDICAID

## 2023-09-19 VITALS
SYSTOLIC BLOOD PRESSURE: 105 MMHG | WEIGHT: 266.81 LBS | BODY MASS INDEX: 49.1 KG/M2 | DIASTOLIC BLOOD PRESSURE: 72 MMHG | OXYGEN SATURATION: 98 % | HEART RATE: 108 BPM | TEMPERATURE: 98 F | RESPIRATION RATE: 20 BRPM | HEIGHT: 62 IN

## 2023-09-19 DIAGNOSIS — O99.213 OBESITY AFFECTING PREGNANCY IN THIRD TRIMESTER: ICD-10-CM

## 2023-09-19 DIAGNOSIS — J30.1 SEASONAL ALLERGIC RHINITIS DUE TO POLLEN: ICD-10-CM

## 2023-09-19 DIAGNOSIS — K21.9 GASTROESOPHAGEAL REFLUX DISEASE WITHOUT ESOPHAGITIS: ICD-10-CM

## 2023-09-19 DIAGNOSIS — Z3A.36 36 WEEKS GESTATION OF PREGNANCY: ICD-10-CM

## 2023-09-19 DIAGNOSIS — O99.343 MENTAL DISORDER DURING PREGNANCY IN THIRD TRIMESTER: ICD-10-CM

## 2023-09-19 DIAGNOSIS — Z91.040 LATEX ALLERGY: ICD-10-CM

## 2023-09-19 DIAGNOSIS — Z3A.37 37 WEEKS GESTATION OF PREGNANCY: Primary | ICD-10-CM

## 2023-09-19 DIAGNOSIS — J06.9 UPPER RESPIRATORY TRACT INFECTION, UNSPECIFIED TYPE: ICD-10-CM

## 2023-09-19 LAB
BILIRUB SERPL-MCNC: NORMAL MG/DL
BLOOD URINE, POC: NORMAL
CLARITY, POC UA: NORMAL
COLOR, POC UA: YELLOW
GLUCOSE UR QL STRIP: NORMAL
KETONES UR QL STRIP: NORMAL
LEUKOCYTE ESTERASE URINE, POC: NORMAL
NITRITE, POC UA: NORMAL
PH, POC UA: 6
PROTEIN, POC: NORMAL
SPECIFIC GRAVITY, POC UA: 1.02
UROBILINOGEN, POC UA: 0.2

## 2023-09-19 PROCEDURE — 81002 URINALYSIS NONAUTO W/O SCOPE: CPT | Mod: PBBFAC

## 2023-09-19 PROCEDURE — 99214 OFFICE O/P EST MOD 30 MIN: CPT | Mod: PBBFAC

## 2023-09-19 RX ORDER — SERTRALINE HYDROCHLORIDE 50 MG/1
50 TABLET, FILM COATED ORAL NIGHTLY
Qty: 90 TABLET | Refills: 0 | Status: SHIPPED | OUTPATIENT
Start: 2023-09-19 | End: 2023-12-18

## 2023-09-19 RX ORDER — PYRIDOXINE HCL (VITAMIN B6) 25 MG
25 TABLET ORAL DAILY
Qty: 30 TABLET | Refills: 0 | Status: SHIPPED | OUTPATIENT
Start: 2023-09-19 | End: 2023-09-26 | Stop reason: SDUPTHER

## 2023-09-19 RX ORDER — DIPHENHYDRAMINE HCL 25 MG
25 CAPSULE ORAL
Status: COMPLETED | OUTPATIENT
Start: 2023-09-19 | End: 2023-09-19

## 2023-09-19 RX ORDER — AMOXICILLIN AND CLAVULANATE POTASSIUM 875; 125 MG/1; MG/1
1 TABLET, FILM COATED ORAL EVERY 12 HOURS
Qty: 14 TABLET | Refills: 0 | Status: ON HOLD | OUTPATIENT
Start: 2023-09-19 | End: 2023-09-29 | Stop reason: HOSPADM

## 2023-09-19 RX ORDER — B-COMPLEX WITH VITAMIN C
1 TABLET ORAL DAILY
Qty: 30 TABLET | Refills: 11 | Status: ON HOLD | OUTPATIENT
Start: 2023-09-19 | End: 2023-09-29 | Stop reason: HOSPADM

## 2023-09-19 RX ADMIN — DIPHENHYDRAMINE HYDROCHLORIDE 25 MG: 25 CAPSULE ORAL at 02:09

## 2023-09-19 NOTE — PROGRESS NOTES
OB Office Visit Note    Name: Earlene Camilo  MRN: [id number]  Date: 2023    Subjective:      Chief Complaint: Routine Prenatal Visit (HROB 37w2d. Reports decreased fetal movement. Needs med refills.)      Earlene Camilo is a 33 y.o.  at 37w2d with CHERELLE 10/8/2023, by1st trimester ultrasound presents to OB clinic for routine visit.    Current issues: at previous HROB visit on 23, pt had nonreactive NST. Subsequent BPP . Today, complains of persistent congestion and cough productive of green-sputum. Denies fever/chills, nausea, or vomiting. Was diagnosed with URI at previous HROB visit and is now s/p course of Azithromycin (last dose completed 23). Reports that yesterday she was experiencing decreased fetal movement; changed positions and drank juice and then felt baby move. Needs med refills today on Zoloft, Vit B6, and PNVs.     Chronic issues:   Anxiety/depression - mood stable overall; compliant with Zoloft 50 mg daily   GERD/ N/V- takes Prevacid 30 mg daily and Vit B6, taking OTC pepcid as well  Obesity   Previous cerclage placement in 2nd pregnancy, no h/o progesterone use or no subsequent pre-term deliveries, following M      PMHx:  see above  PSHx:  no h/o previous c/s or abdominal surgeries reported  SH: support at home and no tobacco use  FHx: No reported pertinent FHx  Meds:   Prior to Admission medications    Medication Sig Start Date End Date Taking? Authorizing Provider   aspirin (ECOTRIN) 81 MG EC tablet Take 81 mg by mouth once daily. Started when she was 3 months pregnant    Provider, Historical   docusate sodium (COLACE) 100 MG capsule Take 100 mg by mouth 2 (two) times daily as needed for Constipation.    Provider, Historical   famotidine (PEPCID) 20 MG tablet Take 1 tablet (20 mg total) by mouth nightly as needed for Heartburn. 23  Odalys Dye MD   fluticasone propionate (FLONASE) 50 mcg/actuation nasal spray fluticasone propionate Take 2 Spray(s) (nasal) 1  time per day for 30 days 2 sprays each nostril once daily. 2022 spray,suspension 1 time per day nasal 30 days active 50 mcg/actuation 22   Provider, Historical   lansoprazole (PREVACID) 30 MG capsule Take 1 capsule (30 mg total) by mouth once daily. 3/30/23 3/29/24  Jasmine Orozco MD   ondansetron (ZOFRAN) 8 MG tablet Take 8 mg by mouth 3 (three) times daily. 3/29/23   Provider, Historical   PRENATAL VITAMIN 27 mg iron- 0.8 mg Tab Take 1 tablet by mouth. 3/30/23   Provider, Historical   pyridoxine, vitamin B6, (VITAMIN B-6) 25 MG Tab Take 1 tablet (25 mg total) by mouth once daily. 23   Odalys Dye MD   sertraline (ZOLOFT) 50 MG tablet Take 1 tablet (50 mg total) by mouth every evening for 83 days, THEN 1 tablet (50 mg total) every evening. 23  Celio Cowan MD     Allergies:   Review of patient's allergies indicates:   Allergen Reactions    Latex, natural rubber Rash       Gestational History:   OB History    Para Term  AB Living   5 3 3 0 1 3   SAB IAB Ectopic Multiple Live Births   1 0 0 0 3      # Outcome Date GA Lbr Jarret/2nd Weight Sex Delivery Anes PTL Lv   5 Current            4 Term 18 39w3d  3.317 kg (7 lb 5 oz) F Vag-Spont None Y DINA   3 Term 17 40w0d  3.946 kg (8 lb 11.2 oz) M Vag-Spont EPI N DINA   2 Term 10/04/15 41w5d  3.402 kg (7 lb 8 oz) M Vag-Spont EPI Y DINA   1 SAB 13 18w0d   F    ND       GYNHx:              LMP: No LMP recorded (lmp unknown). Patient is pregnant.              Last pap: 2023      Antepartum specific ROS  - Fetal movements: Yes   - Vaginal bleeding: No  - Vaginal discharge: No  - Loss of fluid: No  - Contractions: Yes - occasional, irregular  - Headaches: Yes - self-resolving headaches  - Vision changes: No  - Edema: + intermittent hand and feet swelling    Review of Systems  Constitutional: no fever, no chills  CV: no chest pain  RESP: no SOB, + cough and congestion  : no dysuria, no hematuria  GI: no  "constipation, no diarrhea, no nausea, no vomiting  Psych: mood stable - no depression, no anxiety; No SI/HI    Objective:      Vitals:    09/19/23 1246   BP: 105/72   BP Location: Right arm   Patient Position: Lying   BP Method: Large (Automatic)   Pulse: 108   Resp: 20   Temp: 97.6 °F (36.4 °C)   TempSrc: Oral   SpO2: 98%   Weight: 121 kg (266 lb 12.8 oz)   Height: 5' 2" (1.575 m)       Lab Results   Component Value Date    COLORU Yellow 09/19/2023    SPECGRAV 1.020 09/19/2023    PHUR 6.0 09/19/2023    WBCUR small 09/19/2023    NITRITE neg 09/19/2023    PROTEINPOC neg 09/19/2023    GLUCOSEUR neg 09/19/2023    KETONESU trace 09/19/2023    UROBILINOGEN 0.2 09/19/2023    BILIRUBINPOC neg 09/19/2023    RBCUR neg 09/19/2023       General: alert, NAD  RESP: clear to auscultation bilaterally, non labored. Pt with productive cough throughout exam.  CV: regular rate and rhythm, no murmurs, no edema  ABD: gravid, nontender, BS+   Fundal height: 39cm  Cervix: chaperone present. No cervical motion tenderness. Consistency soft, dilation 2cm, and position anterior.      NST in clinic today with  baseline, reactive.    Initial OB Labs:    - Blood Type and Rh: A positive  - Antibody Screen: Negative  - CBC H/H: 11.5/36  - HIV: Nonreactive  - RPR: Non reactive  - GC: Not Detected  - CT: Not Detected  - HBsAg: Nonreactive  - HCVAb: Nonreactive  - Rubella: Immune  - Varicella: Immune  - Sickle Cell Screen: Negative  - PAP: NIL    15-20 Weeks:   - Quad Screen: Normal Risk     28 weeks lab:  - 1H GTT: failed 1 hour, passed 3 hour  - Rhogam: not indicated  - Date of Tdap: Unable to administer due to latex allergy  - CBC H/H: 11.9/36.5  - Syphilis Ab: Non reactive  - BTL consent: signed 7/28/23    36 Week Lab: Ordered 9/14/23  - CBC H/H: 13.6/39.8  - RPR: nonreactive  - GBS Culture: no growth  - HIV: nonreactive  - Cervical GC: negative      Imaging:    U/S 8/21/23: A viable varner pregnancy is visualized in cephalic " presentation.  Estimated fetal weight is at the 50th percentile with an abdominal circumference at the 79th percentile.    No fetal abnormalities are noted and anatomic survey is complete. Amniotic fluid volume is normal.  Placenta is anterior. Biophysical profile Is 8/8.     Urine dip:  Lab Results   Component Value Date    COLORU Yellow 09/19/2023    SPECGRAV 1.020 09/19/2023    PHUR 6.0 09/19/2023    WBCUR small 09/19/2023    NITRITE neg 09/19/2023    PROTEINPOC neg 09/19/2023    GLUCOSEUR neg 09/19/2023    KETONESU trace 09/19/2023    UROBILINOGEN 0.2 09/19/2023    BILIRUBINPOC neg 09/19/2023    RBCUR neg 09/19/2023     Assessment/Plan:     Earlene was seen today for routine prenatal visit.    Diagnoses and all orders for this visit:    37 weeks gestation of pregnancy  -     POCT urine dipstick without microscope  -     PRENATAL VITAMIN 27 mg iron- 0.8 mg Tab; Take 1 tablet by mouth once daily.    -PNVs  -Urine dip reviewed as above  -Routine (initial) labs: reviewed  -Mother plans to breastfeed  -Postpartum contraception discussion: BTL signed 7/28/23  -Labor precautions discussed. ED precautions discussed in depth: vaginal bleeding or leaking fluid, belly cramping or pain, SOB/chest pain, swelling of the face/lower extremities, vision changes. If don't feel the baby move in over an hour. Severe headache that are not resolved with medication    BMI affecting pregnancy in third trimester  -previously followed by MFM  -continue low dose ASA daily    Anxiety/depression during pregnancy in third trimester  -     sertraline (ZOLOFT) 50 MG tablet; Take 1 tablet (50 mg total) by mouth every evening.    -mood stable, denies SI/HI. Compliant with Zoloft daily; refill sent today.    Gastroesophageal reflux disease without esophagitis  -     pyridoxine, vitamin B6, (VITAMIN B-6) 25 MG Tab; Take 1 tablet (25 mg total) by mouth once daily.    -reflux symptoms stable overall. Continue Prevacid, Pepcid and Vit B6 (refilled  today).    Upper respiratory tract infection, unspecified type  -     amoxicillin-clavulanate 875-125mg (AUGMENTIN) 875-125 mg per tablet; Take 1 tablet by mouth every 12 (twelve) hours. for 7 days    -s/p course of Azithromycin with persistent productive cough  -will send rx for Augmentin BID x 7 days      Follow up in about 1 week (around 9/26/2023) for f/u HROB.      Pily Rodriguez MD  [facility] , [facility]      Addendum: Patient stopped me in hallway to report vaginal discomfort s/p exam due to latex. Cold compress and benadryl given with resolution of symptoms.

## 2023-09-19 NOTE — PROGRESS NOTES
FETAL ASSESSMENT REPORT    RE: Earlene Camilo  MRN:  [id number]  :  1990  AGE:  33 y.o.    Date:  2023    REFERRAL PHYSICIAN: [facility]    Allergies: Latex, natural rubber    Earlene is a 33 y.o.  at 37w2d gestational age here today for a NST.    10/8/2023, by Ultrasound    MEDICATIONS AT TIME OF TEST:    Current Outpatient Medications   Medication Sig Dispense Refill    aspirin (ECOTRIN) 81 MG EC tablet Take 81 mg by mouth once daily. Started when she was 3 months pregnant      docusate sodium (COLACE) 100 MG capsule Take 100 mg by mouth 2 (two) times daily as needed for Constipation.      famotidine (PEPCID) 20 MG tablet Take 1 tablet (20 mg total) by mouth nightly as needed for Heartburn. 30 tablet 2    fluticasone propionate (FLONASE) 50 mcg/actuation nasal spray fluticasone propionate Take 2 Spray(s) (nasal) 1 time per day for 30 days 2 sprays each nostril once daily. 2022 spray,suspension 1 time per day nasal 30 days active 50 mcg/actuation      lansoprazole (PREVACID) 30 MG capsule Take 1 capsule (30 mg total) by mouth once daily. 30 capsule 11    ondansetron (ZOFRAN) 8 MG tablet Take 8 mg by mouth 3 (three) times daily.      amoxicillin-clavulanate 875-125mg (AUGMENTIN) 875-125 mg per tablet Take 1 tablet by mouth every 12 (twelve) hours. for 7 days 14 tablet 0    PRENATAL VITAMIN 27 mg iron- 0.8 mg Tab Take 1 tablet by mouth once daily. 30 tablet 11    pyridoxine, vitamin B6, (VITAMIN B-6) 25 MG Tab Take 1 tablet (25 mg total) by mouth once daily. 30 tablet 0    sertraline (ZOLOFT) 50 MG tablet Take 1 tablet (50 mg total) by mouth every evening. 90 tablet 0     No current facility-administered medications for this visit.       Indication: poor ob hx    Interpretation:  135 BPM baseline    Variability:  Good {> 6 bpm)    Accelerations:  Reactive    Decelerations:  none    Assessment: reactive    Plan:  NST scheduled, NST reactive      Daniel De Anda  MD  9/19/2023; 1:50 PM

## 2023-09-20 ENCOUNTER — TELEPHONE (OUTPATIENT)
Dept: OBGYN | Facility: CLINIC | Age: 33
End: 2023-09-20
Payer: MEDICAID

## 2023-09-20 NOTE — TELEPHONE ENCOUNTER
Called patient to check status after clinic visit yesterday.  Pt states she used topical Benadryl 3 times throughout the night and condition has improved.  Pt denies needs or concerns at this time. Advised patient to call clinic if needed.

## 2023-09-20 NOTE — PROGRESS NOTES
Unfortunately patient was examined with latex containing glove.  She states that her reaction to latex is basic rash denies episodes of anaphylaxis in previous exposures.  She was given oral dose of Benadryl and monitored.  When symptoms subsided she was discharged from the clinic.  Reviewed signs and symptoms of worsening reaction.    I have personally reviewed the review of systems (ROS) and past, family and social histories (PFSH) documented above by the resident.  I have reviewed the care furnished by the resident during the encounter, including a review of the patient's medical history, the resident's findings on physical examination, diagnosis, and the treatment plan.  I participated in the management of the patient and was immediately available throughout the encounter.   I was physically present during all key portions of the service(s) provided with the resident.  Services were furnished in a primary care center located in the outpatient department of a [facility].

## 2023-09-26 ENCOUNTER — HOSPITAL ENCOUNTER (INPATIENT)
Facility: HOSPITAL | Age: 33
LOS: 3 days | Discharge: HOME | End: 2023-09-29
Attending: OBSTETRICS & GYNECOLOGY | Admitting: OBSTETRICS & GYNECOLOGY
Payer: MEDICAID

## 2023-09-26 ENCOUNTER — OFFICE VISIT (OUTPATIENT)
Dept: FAMILY MEDICINE | Facility: CLINIC | Age: 33
End: 2023-09-26
Payer: MEDICAID

## 2023-09-26 VITALS
RESPIRATION RATE: 18 BRPM | SYSTOLIC BLOOD PRESSURE: 105 MMHG | BODY MASS INDEX: 48.98 KG/M2 | TEMPERATURE: 98 F | WEIGHT: 266.19 LBS | DIASTOLIC BLOOD PRESSURE: 75 MMHG | OXYGEN SATURATION: 98 % | HEIGHT: 62 IN | HEART RATE: 110 BPM

## 2023-09-26 DIAGNOSIS — Z3A.38 38 WEEKS GESTATION OF PREGNANCY: Primary | ICD-10-CM

## 2023-09-26 DIAGNOSIS — K21.9 GASTROESOPHAGEAL REFLUX DISEASE WITHOUT ESOPHAGITIS: ICD-10-CM

## 2023-09-26 DIAGNOSIS — O47.9 UTERINE CONTRACTIONS DURING PREGNANCY: Primary | ICD-10-CM

## 2023-09-26 DIAGNOSIS — Z30.09 UNWANTED FERTILITY: ICD-10-CM

## 2023-09-26 DIAGNOSIS — Z3A.37 37 WEEKS GESTATION OF PREGNANCY: ICD-10-CM

## 2023-09-26 DIAGNOSIS — Z78.9 ADMITTED TO LABOR AND DELIVERY: ICD-10-CM

## 2023-09-26 LAB
BASOPHILS # BLD AUTO: 0.03 X10(3)/MCL
BASOPHILS NFR BLD AUTO: 0.4 %
BILIRUB SERPL-MCNC: NORMAL MG/DL
BLOOD URINE, POC: NORMAL
CLARITY, POC UA: NORMAL
COLOR, POC UA: NORMAL
EOSINOPHIL # BLD AUTO: 0.04 X10(3)/MCL (ref 0–0.9)
EOSINOPHIL NFR BLD AUTO: 0.5 %
ERYTHROCYTE [DISTWIDTH] IN BLOOD BY AUTOMATED COUNT: 15.5 % (ref 11.5–17)
GLUCOSE UR QL STRIP: NORMAL
GROUP & RH: NORMAL
HCT VFR BLD AUTO: 41.5 % (ref 37–47)
HGB BLD-MCNC: 13.9 G/DL (ref 12–16)
IMM GRANULOCYTES # BLD AUTO: 0.02 X10(3)/MCL (ref 0–0.04)
IMM GRANULOCYTES NFR BLD AUTO: 0.2 %
INDIRECT COOMBS GEL: NORMAL
KETONES UR QL STRIP: NORMAL
LEUKOCYTE ESTERASE URINE, POC: NORMAL
LYMPHOCYTES # BLD AUTO: 1.96 X10(3)/MCL (ref 0.6–4.6)
LYMPHOCYTES NFR BLD AUTO: 24.2 %
MCH RBC QN AUTO: 28.7 PG (ref 27–31)
MCHC RBC AUTO-ENTMCNC: 33.5 G/DL (ref 33–36)
MCV RBC AUTO: 85.6 FL (ref 80–94)
MONOCYTES # BLD AUTO: 0.52 X10(3)/MCL (ref 0.1–1.3)
MONOCYTES NFR BLD AUTO: 6.4 %
NEUTROPHILS # BLD AUTO: 5.52 X10(3)/MCL (ref 2.1–9.2)
NEUTROPHILS NFR BLD AUTO: 68.3 %
NITRITE, POC UA: NORMAL
NRBC BLD AUTO-RTO: 0 %
PH, POC UA: 5.5
PLATELET # BLD AUTO: 284 X10(3)/MCL (ref 130–400)
PMV BLD AUTO: 10.8 FL (ref 7.4–10.4)
PROTEIN, POC: NORMAL
RBC # BLD AUTO: 4.85 X10(6)/MCL (ref 4.2–5.4)
SPECIFIC GRAVITY, POC UA: >1.03
SPECIMEN OUTDATE: NORMAL
T PALLIDUM AB SER QL: NONREACTIVE
UROBILINOGEN, POC UA: 0.2
WBC # SPEC AUTO: 8.09 X10(3)/MCL (ref 4.5–11.5)

## 2023-09-26 PROCEDURE — 63600175 PHARM REV CODE 636 W HCPCS: Performed by: OBSTETRICS & GYNECOLOGY

## 2023-09-26 PROCEDURE — 99213 OFFICE O/P EST LOW 20 MIN: CPT | Mod: PBBFAC

## 2023-09-26 PROCEDURE — 81002 URINALYSIS NONAUTO W/O SCOPE: CPT | Mod: PBBFAC

## 2023-09-26 PROCEDURE — 99285 EMERGENCY DEPT VISIT HI MDM: CPT | Mod: 27

## 2023-09-26 PROCEDURE — 86900 BLOOD TYPING SEROLOGIC ABO: CPT | Performed by: OBSTETRICS & GYNECOLOGY

## 2023-09-26 PROCEDURE — 85025 COMPLETE CBC W/AUTO DIFF WBC: CPT | Performed by: OBSTETRICS & GYNECOLOGY

## 2023-09-26 PROCEDURE — 11000001 HC ACUTE MED/SURG PRIVATE ROOM

## 2023-09-26 PROCEDURE — 86780 TREPONEMA PALLIDUM: CPT | Performed by: OBSTETRICS & GYNECOLOGY

## 2023-09-26 RX ORDER — FENTANYL/BUPIVACAINE/NS/PF 2-1250MCG
PLASTIC BAG, INJECTION (ML) INJECTION CONTINUOUS
Status: DISCONTINUED | OUTPATIENT
Start: 2023-09-27 | End: 2023-09-29 | Stop reason: HOSPADM

## 2023-09-26 RX ORDER — DIPHENOXYLATE HYDROCHLORIDE AND ATROPINE SULFATE 2.5; .025 MG/1; MG/1
2 TABLET ORAL EVERY 6 HOURS PRN
Status: DISCONTINUED | OUTPATIENT
Start: 2023-09-26 | End: 2023-09-27

## 2023-09-26 RX ORDER — CALCIUM CARBONATE 200(500)MG
500 TABLET,CHEWABLE ORAL 3 TIMES DAILY PRN
Status: DISCONTINUED | OUTPATIENT
Start: 2023-09-26 | End: 2023-09-27

## 2023-09-26 RX ORDER — EPHEDRINE SULFATE 50 MG/ML
25 INJECTION, SOLUTION INTRAVENOUS
Status: DISCONTINUED | OUTPATIENT
Start: 2023-09-26 | End: 2023-09-29 | Stop reason: HOSPADM

## 2023-09-26 RX ORDER — MISOPROSTOL 100 UG/1
800 TABLET ORAL ONCE AS NEEDED
Status: DISCONTINUED | OUTPATIENT
Start: 2023-09-26 | End: 2023-09-27

## 2023-09-26 RX ORDER — LIDOCAINE HYDROCHLORIDE 10 MG/ML
10 INJECTION INFILTRATION; PERINEURAL ONCE AS NEEDED
Status: DISCONTINUED | OUTPATIENT
Start: 2023-09-26 | End: 2023-09-27

## 2023-09-26 RX ORDER — CARBOPROST TROMETHAMINE 250 UG/ML
250 INJECTION, SOLUTION INTRAMUSCULAR
Status: DISCONTINUED | OUTPATIENT
Start: 2023-09-26 | End: 2023-09-27

## 2023-09-26 RX ORDER — SODIUM CITRATE AND CITRIC ACID MONOHYDRATE 334; 500 MG/5ML; MG/5ML
30 SOLUTION ORAL ONCE
Status: DISCONTINUED | OUTPATIENT
Start: 2023-09-27 | End: 2023-09-29 | Stop reason: HOSPADM

## 2023-09-26 RX ORDER — OXYTOCIN/RINGER'S LACTATE 30/500 ML
334 PLASTIC BAG, INJECTION (ML) INTRAVENOUS ONCE AS NEEDED
Status: DISCONTINUED | OUTPATIENT
Start: 2023-09-26 | End: 2023-09-27

## 2023-09-26 RX ORDER — METHYLERGONOVINE MALEATE 0.2 MG/ML
200 INJECTION INTRAVENOUS
Status: DISCONTINUED | OUTPATIENT
Start: 2023-09-26 | End: 2023-09-27

## 2023-09-26 RX ORDER — MUPIROCIN 20 MG/G
OINTMENT TOPICAL
Status: CANCELLED | OUTPATIENT
Start: 2023-09-26

## 2023-09-26 RX ORDER — EPHEDRINE SULFATE 50 MG/ML
10 INJECTION, SOLUTION INTRAVENOUS
Status: DISCONTINUED | OUTPATIENT
Start: 2023-09-26 | End: 2023-09-29 | Stop reason: HOSPADM

## 2023-09-26 RX ORDER — SODIUM CHLORIDE, SODIUM LACTATE, POTASSIUM CHLORIDE, CALCIUM CHLORIDE 600; 310; 30; 20 MG/100ML; MG/100ML; MG/100ML; MG/100ML
INJECTION, SOLUTION INTRAVENOUS CONTINUOUS
Status: DISCONTINUED | OUTPATIENT
Start: 2023-09-26 | End: 2023-09-27

## 2023-09-26 RX ORDER — OXYTOCIN/RINGER'S LACTATE 30/500 ML
0-30 PLASTIC BAG, INJECTION (ML) INTRAVENOUS CONTINUOUS
Status: DISCONTINUED | OUTPATIENT
Start: 2023-09-26 | End: 2023-09-26

## 2023-09-26 RX ORDER — OXYTOCIN/RINGER'S LACTATE 30/500 ML
0-30 PLASTIC BAG, INJECTION (ML) INTRAVENOUS CONTINUOUS
Status: DISCONTINUED | OUTPATIENT
Start: 2023-09-26 | End: 2023-09-27

## 2023-09-26 RX ORDER — OXYTOCIN/RINGER'S LACTATE 30/500 ML
95 PLASTIC BAG, INJECTION (ML) INTRAVENOUS ONCE
Status: DISCONTINUED | OUTPATIENT
Start: 2023-09-26 | End: 2023-09-27

## 2023-09-26 RX ORDER — OXYTOCIN 10 [USP'U]/ML
10 INJECTION, SOLUTION INTRAMUSCULAR; INTRAVENOUS ONCE AS NEEDED
Status: DISCONTINUED | OUTPATIENT
Start: 2023-09-26 | End: 2023-09-27

## 2023-09-26 RX ORDER — OXYTOCIN/RINGER'S LACTATE 30/500 ML
334 PLASTIC BAG, INJECTION (ML) INTRAVENOUS ONCE
Status: COMPLETED | OUTPATIENT
Start: 2023-09-26 | End: 2023-09-27

## 2023-09-26 RX ORDER — PROCHLORPERAZINE EDISYLATE 5 MG/ML
5 INJECTION INTRAMUSCULAR; INTRAVENOUS EVERY 6 HOURS PRN
Status: DISCONTINUED | OUTPATIENT
Start: 2023-09-26 | End: 2023-09-27

## 2023-09-26 RX ORDER — SIMETHICONE 80 MG
1 TABLET,CHEWABLE ORAL 4 TIMES DAILY PRN
Status: DISCONTINUED | OUTPATIENT
Start: 2023-09-26 | End: 2023-09-27

## 2023-09-26 RX ORDER — PYRIDOXINE HCL (VITAMIN B6) 25 MG
25 TABLET ORAL DAILY
Qty: 30 TABLET | Refills: 1 | Status: SHIPPED | OUTPATIENT
Start: 2023-09-26 | End: 2023-11-16

## 2023-09-26 RX ORDER — ONDANSETRON 4 MG/1
8 TABLET, ORALLY DISINTEGRATING ORAL EVERY 8 HOURS PRN
Status: DISCONTINUED | OUTPATIENT
Start: 2023-09-26 | End: 2023-09-27

## 2023-09-26 RX ORDER — OXYTOCIN/RINGER'S LACTATE 30/500 ML
95 PLASTIC BAG, INJECTION (ML) INTRAVENOUS ONCE AS NEEDED
Status: DISCONTINUED | OUTPATIENT
Start: 2023-09-26 | End: 2023-09-27

## 2023-09-26 RX ADMIN — Medication 2 MILLI-UNITS/MIN: at 07:09

## 2023-09-26 RX ADMIN — SODIUM CHLORIDE, POTASSIUM CHLORIDE, SODIUM LACTATE AND CALCIUM CHLORIDE 1000 ML: 600; 310; 30; 20 INJECTION, SOLUTION INTRAVENOUS at 11:09

## 2023-09-26 RX ADMIN — SODIUM CHLORIDE, POTASSIUM CHLORIDE, SODIUM LACTATE AND CALCIUM CHLORIDE: 600; 310; 30; 20 INJECTION, SOLUTION INTRAVENOUS at 04:09

## 2023-09-26 RX ADMIN — SODIUM CHLORIDE, POTASSIUM CHLORIDE, SODIUM LACTATE AND CALCIUM CHLORIDE: 600; 310; 30; 20 INJECTION, SOLUTION INTRAVENOUS at 11:09

## 2023-09-26 NOTE — ED PROVIDER NOTES
[facility]  [facility]     Admit Date: 2023  YEHUDA Physician: Nandini Winslow  Primary OBGYN:  [facility] clinic    Admit Diagnosis/Chief Complaint: Contractions    Chief Complaint   Patient presents with    Non-stress Test      38.2 week iup sent from [facility] for a failed NST at clinic       Hospital Course:  Earlene Camilo is a 33 y.o.  at 38w2d presents complaining of ctx and sent over for failed NST.  This IUP is complicated by obesity BMI 49, prior 18 wk loss, latex allergy, anxiety.  Patient denies vaginal bleeding, leakage of fluid, headache, vision changes, RUQ pain, dysuria, fever, and nausea/vomiting.  Fetal Movement: normal.    /66   Pulse 100   Temp 97.6 °F (36.4 °C)   Resp 18   LMP  (LMP Unknown)   Breastfeeding No   Temp:  [97.6 °F (36.4 °C)-97.7 °F (36.5 °C)] 97.6 °F (36.4 °C)  Pulse:  [100-121] 100  Resp:  [18] 18  SpO2:  [98 %] 98 %  BP: (105-124)/(66-77) 119/66    General: in no apparent distress alert oriented times 3  Cardiovascular: regular rate and rhythm no murmurs  Respiratory: unlabored  Abdominal: soft, nontender, nondistended, no abnormal masses, no epigastric pain obese FHT present  Back: lumbar tenderness absent CVA tenderness none suprapubic tenderness absent  Extremeties no redness or tenderness in the calves or thighs edema 1+, 2+ DTRs, no clonus, negative Winston's sign bilaterally     SVE (PeriWATCH)  Dilation (cm): 4.5  Effacement (%): 70  Station: -3  Cervical Position: Mid Position  Cervical Consistency: Medium  Examined by:: Nayla GARCIA  Ramírez Score: 6  Simplified Ramírez Score: 4         EFM: Cat 1, 145 modBTV, +accel, no decel (reassuring, reactive)  TOCO: irritability      Medical Decision Making:      LABS:     Recent Results (from the past 24 hour(s))   POCT URINE DIPSTICK WITHOUT MICROSCOPE    Collection Time: 23  1:10 PM   Result Value Ref Range    Color, UA Dark Yellow     pH, UA 5.5     WBC, UA trace      Nitrite, UA neg     Protein, POC trace     Glucose, UA neg     Ketones, UA neg     Urobilinogen, UA 0.2     Bilirubin, POC small     Blood, UA neg     Clarity, UA Slightly Cloudy     Spec Grav UA >1.030        Imaging Results    None          ASSESMENT and clinical impression: Earlene Camilo is a 33 y.o.   at 38w2d with active labor    Discharge Diagnosis/clinical impression:   Patient Active Problem List   Diagnosis    1. Prior poor obstetrical history in third trimester, antepartum    2. BMI affecting pregnancy in third trimester    3. Anxiety/depression during pregnancy in third trimester    GERD (gastroesophageal reflux disease)    Allergic rhinitis     Admit to LDR -- OB Hospitalist service  CBC, T&S ordered  Pt desires epidural for pain management  Fetal status overall reassuring  Amniotomy/pitocin PRN  U/S for OLGA ordered  GBS negative    Nandini Winslow MD  OB/GYN Hospitalist  2:51 PM 2023

## 2023-09-26 NOTE — PROGRESS NOTES
OB Office Visit Note    Name: Earlene Camilo  MRN: [id number]  Date: 2023    Subjective:      Chief Complaint: Routine Prenatal Visit (HROB 38w2d. Reports less fetal movement. Requests induction date.)      Earlene Camilo is a 33 y.o.  at 38w2d with CHERELLE 10/8/2023, by1st trimester ultrasound presents to OB clinic for routine visit.    Current issues:   On 23, pt had nonreactive NST. Subsequent BPP . Previously complains of persistent congestion and cough productive of green-sputum. Denies fever/chills, nausea, or vomiting.   Previous OB visit on 2023, patient was diagnosed with URI at previous HROB visit and completed course of Azithromycin (last dose completed 23). She was given Augmentin 875-125 mg BID x 7 days but states she had picked up the medication today due to transportation. She started the medication today and states her symptoms are slowly improving. Still denies fever, chill, lethargy, but still admits to dry nonproductive cough.     Chronic issues:   Anxiety/depression - mood stable overall; compliant with Zoloft 50 mg daily     GERD/ N/V- takes Prevacid 30 mg daily and Vit B6, taking OTC pepcid as well    Obesity   Previous cerclage placement in 2nd pregnancy, no h/o progesterone use or no subsequent pre-term deliveries, following M      PMHx:  see above  PSHx:  no h/o previous c/s or abdominal surgeries reported  SH: support at home and no tobacco use  FHx: No reported pertinent FHx  Meds:   Prior to Admission medications    Medication Sig Start Date End Date Taking? Authorizing Provider   aspirin (ECOTRIN) 81 MG EC tablet Take 81 mg by mouth once daily. Started when she was 3 months pregnant    Provider, Historical   docusate sodium (COLACE) 100 MG capsule Take 100 mg by mouth 2 (two) times daily as needed for Constipation.    Provider, Historical   famotidine (PEPCID) 20 MG tablet Take 1 tablet (20 mg total) by mouth nightly as needed for Heartburn. 23   Odalys Dye MD   fluticasone propionate (FLONASE) 50 mcg/actuation nasal spray fluticasone propionate Take 2 Spray(s) (nasal) 1 time per day for 30 days 2 sprays each nostril once daily. 2022 spray,suspension 1 time per day nasal 30 days active 50 mcg/actuation 22   Provider, Historical   lansoprazole (PREVACID) 30 MG capsule Take 1 capsule (30 mg total) by mouth once daily. 3/30/23 3/29/24  Jasmine Orozco MD   ondansetron (ZOFRAN) 8 MG tablet Take 8 mg by mouth 3 (three) times daily. 3/29/23   Provider, Historical   PRENATAL VITAMIN 27 mg iron- 0.8 mg Tab Take 1 tablet by mouth. 3/30/23   Provider, Historical   pyridoxine, vitamin B6, (VITAMIN B-6) 25 MG Tab Take 1 tablet (25 mg total) by mouth once daily. 23   Odalys Dye MD   sertraline (ZOLOFT) 50 MG tablet Take 1 tablet (50 mg total) by mouth every evening for 83 days, THEN 1 tablet (50 mg total) every evening. 23  Celio Cowan MD     Allergies:   Review of patient's allergies indicates:   Allergen Reactions    Latex, natural rubber Rash       Gestational History:   OB History    Para Term  AB Living   5 3 3 0 1 3   SAB IAB Ectopic Multiple Live Births   1 0 0 0 3      # Outcome Date GA Lbr Jarret/2nd Weight Sex Delivery Anes PTL Lv   5 Current            4 Term 18 39w3d  3.317 kg (7 lb 5 oz) F Vag-Spont None Y DINA   3 Term 17 40w0d  3.946 kg (8 lb 11.2 oz) M Vag-Spont EPI N DINA   2 Term 10/04/15 41w5d  3.402 kg (7 lb 8 oz) M Vag-Spont EPI Y DINA   1 SAB 13 18w0d   F    ND       GYNHx:              LMP: No LMP recorded (lmp unknown). Patient is pregnant.              Last pap: 2023      Antepartum specific ROS  - Fetal movements: Yes   - Vaginal bleeding: No  - Vaginal discharge: No  - Loss of fluid: No  - Contractions: Yes - occasional, irregular  - Headaches: Yes - self-resolving headaches  - Vision changes: No  - Edema: + intermittent hand and feet swelling    Review of  "Systems  Constitutional: no fever, no chills  CV: no chest pain  RESP: no SOB, + cough and congestion  : no dysuria, no hematuria  GI: no constipation, no diarrhea, no nausea, no vomiting  Psych: mood stable - no depression, no anxiety; No SI/HI    Objective:      Vitals:    09/26/23 1253   BP: 105/75   BP Location: Right arm   Patient Position: Lying   BP Method: Large (Automatic)   Pulse: 110   Resp: 18   Temp: 97.7 °F (36.5 °C)   TempSrc: Oral   SpO2: 98%   Weight: 120.7 kg (266 lb 3.2 oz)   Height: 5' 2" (1.575 m)     Lab Results   Component Value Date    COLORU Dark Yellow 09/26/2023    SPECGRAV >1.030 09/26/2023    PHUR 5.5 09/26/2023    WBCUR trace 09/26/2023    NITRITE neg 09/26/2023    PROTEINPOC trace 09/26/2023    GLUCOSEUR neg 09/26/2023    KETONESU neg 09/26/2023    UROBILINOGEN 0.2 09/26/2023    BILIRUBINPOC small 09/26/2023    RBCUR neg 09/26/2023     General: alert, NAD  RESP: clear to auscultation bilaterally, non labored. Pt with productive cough throughout exam.  CV: regular rate and rhythm, no murmurs, no edema  ABD: gravid, nontender, BS+   Fundal height: 39cm  Cervix: chaperone present. No cervical motion tenderness. Consistency soft, dilation 3cm, and position anterior.    NST in clinic today with  baseline, reactive.    Initial OB Labs:    - Blood Type and Rh: A positive  - Antibody Screen: Negative  - CBC H/H: 11.5/36  - HIV: Nonreactive  - RPR: Non reactive  - GC: Not Detected  - CT: Not Detected  - HBsAg: Nonreactive  - HCVAb: Nonreactive  - Rubella: Immune  - Varicella: Immune  - Sickle Cell Screen: Negative  - PAP: NIL    15-20 Weeks:   - Quad Screen: Normal Risk     28 weeks lab:  - 1H GTT: failed 1 hour, passed 3 hour  - Rhogam: not indicated  - Date of Tdap: Unable to administer due to latex allergy  - CBC H/H: 11.9/36.5  - Syphilis Ab: Non reactive  - BTL consent: signed 7/28/23    36 Week Lab: Ordered 9/14/23  - CBC H/H: 13.6/39.8  - RPR: nonreactive  - GBS Culture: no " growth  - HIV: nonreactive  - Cervical GC: negative    Imaging:    U/S 8/21/23: A viable varner pregnancy is visualized in cephalic presentation.  Estimated fetal weight is at the 50th percentile with an abdominal circumference at the 79th percentile.    No fetal abnormalities are noted and anatomic survey is complete. Amniotic fluid volume is normal.  Placenta is anterior. Biophysical profile Is 8/8.     Urine dip:  Lab Results   Component Value Date    COLORU Dark Yellow 09/26/2023    SPECGRAV >1.030 09/26/2023    PHUR 5.5 09/26/2023    WBCUR trace 09/26/2023    NITRITE neg 09/26/2023    PROTEINPOC trace 09/26/2023    GLUCOSEUR neg 09/26/2023    KETONESU neg 09/26/2023    UROBILINOGEN 0.2 09/26/2023    BILIRUBINPOC small 09/26/2023    RBCUR neg 09/26/2023     Assessment/Plan:     Earlene was seen today for routine prenatal visit.    Diagnoses and all orders for this visit:    38 weeks gestation of pregnancy  -continue PNVs and ADA  - NST today was borderline; has history of nonreactive NST in clinic visit on 09/14/2023 and was sent to OB ED afterwards. She was ultimately discharged the same day due to reactive/reassuring NSTs.  - attempted to schedule patient for induction in setting of borderline NST examined in high-risk ob today for 10/01/23 and history of nonreactive NST. Labor and  informed that schedule for induction was booked with soonest availability on 10/06/23  - patient was informed to go to [facility] after clinic for possible induction in case NSTs findings at hospital were consist with outpatient clinic NSTs; patient voiced understanding  - on-call attending at [facility] was contacted for patient arrival for possible induction in setting of borderline NSTs.   -Urine dip reviewed as above  -Routine (initial) labs: reviewed  -Mother plans to breastfeed  -Postpartum contraception discussion: BTL signed 7/28/23  -Labor precautions discussed. ED precautions discussed in depth: vaginal bleeding or  leaking fluid, belly cramping or pain, SOB/chest pain, swelling of the face/lower extremities, vision changes. If don't feel the baby move in over an hour. Severe headache that are not resolved with medication    BMI affecting pregnancy in third trimester  - previously followed by MFM  - continue low dose ASA daily    Anxiety/depression during pregnancy in third trimester  - sertraline (ZOLOFT) 50 MG tablet; Take 1 tablet (50 mg total) by mouth every evening.  - mood stable, denies SI/HI. Compliant with Zoloft daily    Gastroesophageal reflux disease without esophagitis  - pyridoxine, vitamin B6, (VITAMIN B-6) 25 MG Tab; Take 1 tablet (25 mg total) by mouth once daily; refilled  - reflux symptoms stable overall. Continue Prevacid, Pepcid and Vit B6     Upper respiratory tract infection, unspecified type  - continue amoxicillin-clavulanate 875-125mg (AUGMENTIN) 875-125 mg per tablet; Take 1 tablet by mouth every 12 (twelve) hours. for 7 days  -s/p course of Azithromycin with persistent productive cough        Follow up in 1 week (on 10/3/2023) for routine ob f/u. In case of possible discharge from hospital today.       George Cardona MD  [facility], [facility]

## 2023-09-26 NOTE — PROGRESS NOTES
FETAL ASSESSMENT REPORT    RE: Earlene Camilo  MRN:  [id number]  :  1990  AGE:  33 y.o.    Date:  2023    REFERRAL PHYSICIAN: [facility]    Allergies: Latex, natural rubber    Earlene is a 33 y.o.  at 38w2d gestational age here today for a NST.    10/8/2023, by Ultrasound    MEDICATIONS AT TIME OF TEST:    Current Outpatient Medications   Medication Sig Dispense Refill    amoxicillin-clavulanate 875-125mg (AUGMENTIN) 875-125 mg per tablet Take 1 tablet by mouth every 12 (twelve) hours. for 7 days 14 tablet 0    aspirin (ECOTRIN) 81 MG EC tablet Take 81 mg by mouth once daily. Started when she was 3 months pregnant      docusate sodium (COLACE) 100 MG capsule Take 100 mg by mouth 2 (two) times daily as needed for Constipation.      famotidine (PEPCID) 20 MG tablet Take 1 tablet (20 mg total) by mouth nightly as needed for Heartburn. 30 tablet 2    fluticasone propionate (FLONASE) 50 mcg/actuation nasal spray fluticasone propionate Take 2 Spray(s) (nasal) 1 time per day for 30 days 2 sprays each nostril once daily. 2022 spray,suspension 1 time per day nasal 30 days active 50 mcg/actuation      lansoprazole (PREVACID) 30 MG capsule Take 1 capsule (30 mg total) by mouth once daily. 30 capsule 11    PRENATAL VITAMIN 27 mg iron- 0.8 mg Tab Take 1 tablet by mouth once daily. 30 tablet 11    pyridoxine, vitamin B6, (VITAMIN B-6) 25 MG Tab Take 1 tablet (25 mg total) by mouth once daily. 30 tablet 1    sertraline (ZOLOFT) 50 MG tablet Take 1 tablet (50 mg total) by mouth every evening. 90 tablet 0     No current facility-administered medications for this visit.       Indication: Poor OBHx      Interpretation:  140 BPM baseline    Variability:  Good {> 6 bpm)    Accelerations:  Reactive    Decelerations:  none    Assessment:  Borderline reactive    Plan:  Ordered BPP at [facility]      Daniel De Anda MD  2023; 1:39 PM

## 2023-09-26 NOTE — H&P
HISTORY AND PHYSICAL                                                OBSTETRICS          Subjective:      Earlene Camilo is a 33 y.o.  at 38w2d presents complaining of ctx and sent over for failed NST.  This IUP is complicated by obesity BMI 49, prior 18 wk loss, latex allergy, anxiety.  Patient denies vaginal bleeding, leakage of fluid, headache, vision changes, RUQ pain, dysuria, fever, and nausea/vomiting.  Fetal Movement: normal.    PMHx:   Past Medical History:   Diagnosis Date    Anemia     Mental disorder     Postpartum depression        PSHx:   Past Surgical History:   Procedure Laterality Date    ORIF, FRACTURE, TIBIAL TUBEROSITY Left        All:   Review of patient's allergies indicates:   Allergen Reactions    Latex, natural rubber Rash       Meds:   Medications Prior to Admission   Medication Sig Dispense Refill Last Dose    amoxicillin-clavulanate 875-125mg (AUGMENTIN) 875-125 mg per tablet Take 1 tablet by mouth every 12 (twelve) hours. for 7 days 14 tablet 0     aspirin (ECOTRIN) 81 MG EC tablet Take 81 mg by mouth once daily. Started when she was 3 months pregnant       docusate sodium (COLACE) 100 MG capsule Take 100 mg by mouth 2 (two) times daily as needed for Constipation.       famotidine (PEPCID) 20 MG tablet Take 1 tablet (20 mg total) by mouth nightly as needed for Heartburn. 30 tablet 2     fluticasone propionate (FLONASE) 50 mcg/actuation nasal spray fluticasone propionate Take 2 Spray(s) (nasal) 1 time per day for 30 days 2 sprays each nostril once daily. 2022 spray,suspension 1 time per day nasal 30 days active 50 mcg/actuation       lansoprazole (PREVACID) 30 MG capsule Take 1 capsule (30 mg total) by mouth once daily. 30 capsule 11     PRENATAL VITAMIN 27 mg iron- 0.8 mg Tab Take 1 tablet by mouth once daily. 30 tablet 11     pyridoxine, vitamin B6, (VITAMIN B-6) 25 MG Tab Take 1 tablet (25 mg total) by mouth once daily. 30 tablet 1     sertraline (ZOLOFT) 50 MG tablet Take  1 tablet (50 mg total) by mouth every evening. 90 tablet 0        SH:   Social History     Socioeconomic History    Marital status: Single    Number of children: 3   Occupational History    Occupation: unemployed   Tobacco Use    Smoking status: Former     Current packs/day: 0.00     Types: Cigarettes     Quit date: 2023     Years since quittin.6     Passive exposure: Never    Smokeless tobacco: Former   Substance and Sexual Activity    Alcohol use: Not Currently    Drug use: Not Currently     Types: Marijuana     Comment: last use 2023    Sexual activity: Yes     Partners: Male     Birth control/protection: None     Social Determinants of Health     Financial Resource Strain: Low Risk  (2023)    Overall Financial Resource Strain (CARDIA)     Difficulty of Paying Living Expenses: Not hard at all   Food Insecurity: No Food Insecurity (2023)    Hunger Vital Sign     Worried About Running Out of Food in the Last Year: Never true     Ran Out of Food in the Last Year: Never true   Transportation Needs: No Transportation Needs (2023)    PRAPARE - Transportation     Lack of Transportation (Medical): No     Lack of Transportation (Non-Medical): No   Physical Activity: Insufficiently Active (2023)    Exercise Vital Sign     Days of Exercise per Week: 3 days     Minutes of Exercise per Session: 30 min   Stress: Stress Concern Present (2023)    [employer] [place] [employer] - Occupational Stress Questionnaire     Feeling of Stress : Rather much   Social Connections: Moderately Isolated (2023)    Social Connection and Isolation Panel [NHANES]     Frequency of Communication with Friends and Family: More than three times a week     Frequency of Social Gatherings with Friends and Family: Twice a week     Attends [employer] Services: Never     Active Member of Clubs or Organizations: No     Attends Club or Organization Meetings: Never     Marital Status: Living with partner   Housing  Stability: High Risk (2023)    Housing Stability Vital Sign     Unable to Pay for Housing in the Last Year: Yes     Number of Places Lived in the Last Year: 1     Unstable Housing in the Last Year: No       FH:   Family History   Problem Relation Age of Onset    Heart attack Maternal Grandfather     Hypertension Father     Miscarriages / Stillbirths Sister        OBHx:   OB History    Para Term  AB Living   5 3 3 0 1 3   SAB IAB Ectopic Multiple Live Births   1 0 0 0 3      # Outcome Date GA Lbr Jarret/2nd Weight Sex Delivery Anes PTL Lv   5 Current            4 Term 18 39w3d  3.317 kg (7 lb 5 oz) F Vag-Spont None Y DINA   3 Term 17 40w0d  3.946 kg (8 lb 11.2 oz) M Vag-Spont EPI N DINA   2 Term 10/04/15 41w5d  3.402 kg (7 lb 8 oz) M Vag-Spont EPI Y DINA   1 SAB 13 18w0d   F    ND       Objective:      /66   Pulse 100   Temp 97.6 °F (36.4 °C)   Resp 18   LMP  (LMP Unknown)   Breastfeeding No   There is no height or weight on file to calculate BMI.    General: in no apparent distress alert oriented times 3  Cardiovascular: regular rate and rhythm no murmurs  Respiratory: unlabored  Abdominal: soft, nontender, nondistended, no abnormal masses, no epigastric pain obese FHT present  Back: lumbar tenderness absent CVA tenderness none suprapubic tenderness absent  Extremeties no redness or tenderness in the calves or thighs edema 1+, 2+ DTRs, no clonus, negative Winston's sign bilaterally      SVE (PeriWATCH)  Dilation (cm): 4.5  Effacement (%): 70  Station: -3  Cervical Position: Mid Position  Cervical Consistency: Medium  Examined by:: Nayla GARCIA  Ramírez Score: 6  Simplified Ramírez Score: 4            EFM: Cat 1, 145 modBTV, +accel, no decel (reassuring, reactive)  TOCO: irritability       Lab Review  Initial OB Labs:    - Blood Type and Rh: A positive  - Antibody Screen: Negative  - CBC H/H: 11.5/36  - HIV: Nonreactive  - RPR: Non reactive  - GC: Not Detected  - CT: Not Detected  -  HBsAg: Nonreactive  - HCVAb: Nonreactive  - Rubella: Immune  - Varicella: Immune  - Sickle Cell Screen: Negative  - PAP: NIL    15-20 Weeks:   - Quad Screen: Normal Risk     28 weeks lab:  - 1H GTT: failed 1 hour, passed 3 hour  - Rhogam: not indicated  - Date of Tdap: Unable to administer due to latex allergy  - CBC H/H: 11.9/36.5  - Syphilis Ab: Non reactive  - BTL consent: signed 23    36 Week Lab: Ordered 23  - CBC H/H: 13.6/39.8  - RPR: nonreactive  - GBS Culture: no growth  - HIV: nonreactive  - Cervical GC: negative        Lab Results   Component Value Date    WBC 9.57 2023    HGB 13.6 2023    HCT 39.8 2023    MCV 85.0 2023     2023           Assessment:     33 y.o.  at 38w2d weeks gestation.  Entering active phase of labor    Active Hospital Problems    Diagnosis  POA    *1. Prior poor obstetrical history in third trimester, antepartum [O09.293]  Not Applicable    GERD (gastroesophageal reflux disease) [K21.9]  Yes    2. BMI affecting pregnancy in third trimester [O99.213]  Yes    3. Anxiety/depression during pregnancy in third trimester [O99.343]  Yes      Resolved Hospital Problems   No resolved problems to display.          Plan:     1. Risks, benefits, alternatives and possible complications have been discussed in detail with the patient. All questions have been answered, and Ms. Camilo has voiced understanding and agrees to the treatment plan.  2. Consents signed and in chart  3. Admit to Labor and Delivery unit  4. Augment PRN, U/S pending for OLGA, epidural PRN, fetal status reassuring     Nandini Winslow MD  OB/GYN Hospitalist  3:57 PM 2023

## 2023-09-27 PROBLEM — Z30.09 UNWANTED FERTILITY: Status: ACTIVE | Noted: 2023-09-27

## 2023-09-27 LAB
BASOPHILS # BLD AUTO: 0.01 X10(3)/MCL
BASOPHILS NFR BLD AUTO: 0.1 %
EOSINOPHIL # BLD AUTO: 0.03 X10(3)/MCL (ref 0–0.9)
EOSINOPHIL NFR BLD AUTO: 0.2 %
ERYTHROCYTE [DISTWIDTH] IN BLOOD BY AUTOMATED COUNT: 15.5 % (ref 11.5–17)
HCT VFR BLD AUTO: 35.8 % (ref 37–47)
HGB BLD-MCNC: 11.9 G/DL (ref 12–16)
IMM GRANULOCYTES # BLD AUTO: 0.05 X10(3)/MCL (ref 0–0.04)
IMM GRANULOCYTES NFR BLD AUTO: 0.4 %
LYMPHOCYTES # BLD AUTO: 2.18 X10(3)/MCL (ref 0.6–4.6)
LYMPHOCYTES NFR BLD AUTO: 16.5 %
MCH RBC QN AUTO: 28.3 PG (ref 27–31)
MCHC RBC AUTO-ENTMCNC: 33.2 G/DL (ref 33–36)
MCV RBC AUTO: 85.2 FL (ref 80–94)
MONOCYTES # BLD AUTO: 0.83 X10(3)/MCL (ref 0.1–1.3)
MONOCYTES NFR BLD AUTO: 6.3 %
NEUTROPHILS # BLD AUTO: 10.09 X10(3)/MCL (ref 2.1–9.2)
NEUTROPHILS NFR BLD AUTO: 76.5 %
NRBC BLD AUTO-RTO: 0 %
PLATELET # BLD AUTO: 222 X10(3)/MCL (ref 130–400)
PMV BLD AUTO: 11.1 FL (ref 7.4–10.4)
RBC # BLD AUTO: 4.2 X10(6)/MCL (ref 4.2–5.4)
WBC # SPEC AUTO: 13.19 X10(3)/MCL (ref 4.5–11.5)

## 2023-09-27 PROCEDURE — 11000001 HC ACUTE MED/SURG PRIVATE ROOM

## 2023-09-27 PROCEDURE — 85025 COMPLETE CBC W/AUTO DIFF WBC: CPT | Performed by: OBSTETRICS & GYNECOLOGY

## 2023-09-27 PROCEDURE — 25000003 PHARM REV CODE 250: Performed by: OBSTETRICS & GYNECOLOGY

## 2023-09-27 PROCEDURE — 72200005 HC VAGINAL DELIVERY LEVEL II

## 2023-09-27 PROCEDURE — 63600175 PHARM REV CODE 636 W HCPCS: Performed by: OBSTETRICS & GYNECOLOGY

## 2023-09-27 PROCEDURE — 90471 IMMUNIZATION ADMIN: CPT | Performed by: OBSTETRICS & GYNECOLOGY

## 2023-09-27 PROCEDURE — 63600175 PHARM REV CODE 636 W HCPCS

## 2023-09-27 PROCEDURE — 51702 INSERT TEMP BLADDER CATH: CPT

## 2023-09-27 PROCEDURE — 72100002 HC LABOR CARE, 1ST 8 HOURS

## 2023-09-27 PROCEDURE — 90686 IIV4 VACC NO PRSV 0.5 ML IM: CPT | Performed by: OBSTETRICS & GYNECOLOGY

## 2023-09-27 PROCEDURE — 72100003 HC LABOR CARE, EA. ADDL. 8 HRS

## 2023-09-27 RX ORDER — METHYLERGONOVINE MALEATE 0.2 MG/ML
200 INJECTION INTRAVENOUS
Status: DISCONTINUED | OUTPATIENT
Start: 2023-09-27 | End: 2023-09-29 | Stop reason: HOSPADM

## 2023-09-27 RX ORDER — SIMETHICONE 80 MG
1 TABLET,CHEWABLE ORAL EVERY 6 HOURS PRN
Status: DISCONTINUED | OUTPATIENT
Start: 2023-09-27 | End: 2023-09-29 | Stop reason: HOSPADM

## 2023-09-27 RX ORDER — HYDROCORTISONE 25 MG/G
CREAM TOPICAL 3 TIMES DAILY PRN
Status: DISCONTINUED | OUTPATIENT
Start: 2023-09-27 | End: 2023-09-29 | Stop reason: HOSPADM

## 2023-09-27 RX ORDER — OXYCODONE AND ACETAMINOPHEN 10; 325 MG/1; MG/1
1 TABLET ORAL EVERY 4 HOURS PRN
Status: DISCONTINUED | OUTPATIENT
Start: 2023-09-27 | End: 2023-09-29 | Stop reason: HOSPADM

## 2023-09-27 RX ORDER — SODIUM CHLORIDE, SODIUM LACTATE, POTASSIUM CHLORIDE, CALCIUM CHLORIDE 600; 310; 30; 20 MG/100ML; MG/100ML; MG/100ML; MG/100ML
INJECTION, SOLUTION INTRAVENOUS CONTINUOUS
Status: DISCONTINUED | OUTPATIENT
Start: 2023-09-27 | End: 2023-09-27

## 2023-09-27 RX ORDER — CARBOPROST TROMETHAMINE 250 UG/ML
250 INJECTION, SOLUTION INTRAMUSCULAR
Status: DISCONTINUED | OUTPATIENT
Start: 2023-09-27 | End: 2023-09-29 | Stop reason: HOSPADM

## 2023-09-27 RX ORDER — OXYTOCIN 10 [USP'U]/ML
10 INJECTION, SOLUTION INTRAMUSCULAR; INTRAVENOUS ONCE AS NEEDED
Status: DISCONTINUED | OUTPATIENT
Start: 2023-09-27 | End: 2023-09-29 | Stop reason: HOSPADM

## 2023-09-27 RX ORDER — DIPHENHYDRAMINE HCL 25 MG
25 CAPSULE ORAL EVERY 4 HOURS PRN
Status: DISCONTINUED | OUTPATIENT
Start: 2023-09-27 | End: 2023-09-29 | Stop reason: HOSPADM

## 2023-09-27 RX ORDER — PROCHLORPERAZINE EDISYLATE 5 MG/ML
5 INJECTION INTRAMUSCULAR; INTRAVENOUS EVERY 6 HOURS PRN
Status: DISCONTINUED | OUTPATIENT
Start: 2023-09-27 | End: 2023-09-29 | Stop reason: HOSPADM

## 2023-09-27 RX ORDER — DIPHENOXYLATE HYDROCHLORIDE AND ATROPINE SULFATE 2.5; .025 MG/1; MG/1
2 TABLET ORAL EVERY 6 HOURS PRN
Status: DISCONTINUED | OUTPATIENT
Start: 2023-09-27 | End: 2023-09-29 | Stop reason: HOSPADM

## 2023-09-27 RX ORDER — PRENATAL WITH FERROUS FUM AND FOLIC ACID 3080; 920; 120; 400; 22; 1.84; 3; 20; 10; 1; 12; 200; 27; 25; 2 [IU]/1; [IU]/1; MG/1; [IU]/1; MG/1; MG/1; MG/1; MG/1; MG/1; MG/1; UG/1; MG/1; MG/1; MG/1; MG/1
1 TABLET ORAL DAILY
Status: DISCONTINUED | OUTPATIENT
Start: 2023-09-27 | End: 2023-09-29 | Stop reason: HOSPADM

## 2023-09-27 RX ORDER — ACETAMINOPHEN 325 MG/1
650 TABLET ORAL EVERY 6 HOURS PRN
Status: DISCONTINUED | OUTPATIENT
Start: 2023-09-27 | End: 2023-09-29 | Stop reason: HOSPADM

## 2023-09-27 RX ORDER — MISOPROSTOL 100 UG/1
800 TABLET ORAL ONCE AS NEEDED
Status: DISCONTINUED | OUTPATIENT
Start: 2023-09-27 | End: 2023-09-29 | Stop reason: HOSPADM

## 2023-09-27 RX ORDER — DIPHENHYDRAMINE HYDROCHLORIDE 50 MG/ML
25 INJECTION INTRAMUSCULAR; INTRAVENOUS EVERY 4 HOURS PRN
Status: DISCONTINUED | OUTPATIENT
Start: 2023-09-27 | End: 2023-09-29 | Stop reason: HOSPADM

## 2023-09-27 RX ORDER — SODIUM CHLORIDE, SODIUM LACTATE, POTASSIUM CHLORIDE, CALCIUM CHLORIDE 600; 310; 30; 20 MG/100ML; MG/100ML; MG/100ML; MG/100ML
INJECTION, SOLUTION INTRAVENOUS CONTINUOUS
Status: DISCONTINUED | OUTPATIENT
Start: 2023-09-28 | End: 2023-09-27

## 2023-09-27 RX ORDER — OXYTOCIN/RINGER'S LACTATE 30/500 ML
95 PLASTIC BAG, INJECTION (ML) INTRAVENOUS ONCE
Status: COMPLETED | OUTPATIENT
Start: 2023-09-27 | End: 2023-09-27

## 2023-09-27 RX ORDER — ONDANSETRON 4 MG/1
8 TABLET, ORALLY DISINTEGRATING ORAL EVERY 8 HOURS PRN
Status: DISCONTINUED | OUTPATIENT
Start: 2023-09-27 | End: 2023-09-29 | Stop reason: HOSPADM

## 2023-09-27 RX ORDER — IBUPROFEN 600 MG/1
600 TABLET ORAL EVERY 6 HOURS PRN
Status: DISCONTINUED | OUTPATIENT
Start: 2023-09-27 | End: 2023-09-29 | Stop reason: HOSPADM

## 2023-09-27 RX ORDER — SERTRALINE HYDROCHLORIDE 50 MG/1
50 TABLET, FILM COATED ORAL DAILY
Status: DISCONTINUED | OUTPATIENT
Start: 2023-09-28 | End: 2023-09-28

## 2023-09-27 RX ORDER — SODIUM CHLORIDE 0.9 % (FLUSH) 0.9 %
10 SYRINGE (ML) INJECTION
Status: DISCONTINUED | OUTPATIENT
Start: 2023-09-27 | End: 2023-09-29 | Stop reason: HOSPADM

## 2023-09-27 RX ORDER — OXYTOCIN/RINGER'S LACTATE 30/500 ML
334 PLASTIC BAG, INJECTION (ML) INTRAVENOUS ONCE AS NEEDED
Status: DISCONTINUED | OUTPATIENT
Start: 2023-09-27 | End: 2023-09-29 | Stop reason: HOSPADM

## 2023-09-27 RX ORDER — HYDROMORPHONE HYDROCHLORIDE 2 MG/ML
1 INJECTION, SOLUTION INTRAMUSCULAR; INTRAVENOUS; SUBCUTANEOUS EVERY 6 HOURS PRN
Status: DISCONTINUED | OUTPATIENT
Start: 2023-09-27 | End: 2023-09-29 | Stop reason: HOSPADM

## 2023-09-27 RX ORDER — DOCUSATE SODIUM 100 MG/1
200 CAPSULE, LIQUID FILLED ORAL 2 TIMES DAILY PRN
Status: DISCONTINUED | OUTPATIENT
Start: 2023-09-27 | End: 2023-09-29 | Stop reason: HOSPADM

## 2023-09-27 RX ORDER — OXYTOCIN/RINGER'S LACTATE 30/500 ML
95 PLASTIC BAG, INJECTION (ML) INTRAVENOUS ONCE AS NEEDED
Status: DISCONTINUED | OUTPATIENT
Start: 2023-09-27 | End: 2023-09-29 | Stop reason: HOSPADM

## 2023-09-27 RX ORDER — OXYCODONE AND ACETAMINOPHEN 5; 325 MG/1; MG/1
1 TABLET ORAL EVERY 4 HOURS PRN
Status: DISCONTINUED | OUTPATIENT
Start: 2023-09-27 | End: 2023-09-29 | Stop reason: HOSPADM

## 2023-09-27 RX ADMIN — IBUPROFEN 600 MG: 600 TABLET, FILM COATED ORAL at 05:09

## 2023-09-27 RX ADMIN — OXYCODONE AND ACETAMINOPHEN 1 TABLET: 10; 325 TABLET ORAL at 08:09

## 2023-09-27 RX ADMIN — INFLUENZA VIRUS VACCINE 0.5 ML: 15; 15; 15; 15 SUSPENSION INTRAMUSCULAR at 12:09

## 2023-09-27 RX ADMIN — Medication: at 05:09

## 2023-09-27 RX ADMIN — Medication 95 MILLI-UNITS/MIN: at 05:09

## 2023-09-27 RX ADMIN — SODIUM CHLORIDE, POTASSIUM CHLORIDE, SODIUM LACTATE AND CALCIUM CHLORIDE: 600; 310; 30; 20 INJECTION, SOLUTION INTRAVENOUS at 12:09

## 2023-09-27 RX ADMIN — OXYCODONE HYDROCHLORIDE AND ACETAMINOPHEN 1 TABLET: 5; 325 TABLET ORAL at 08:09

## 2023-09-27 RX ADMIN — Medication 334 MILLI-UNITS/MIN: at 03:09

## 2023-09-27 RX ADMIN — IBUPROFEN 600 MG: 600 TABLET, FILM COATED ORAL at 12:09

## 2023-09-27 NOTE — PROGRESS NOTES
OB Hospitalist Note  Labor Progress    Pt comfortable, thinking about epidural request. Wants amniotomy.  Denies HA, blurry vision/scotoma, or RUQ/epigastric pains  Temp:  [97.6 °F (36.4 °C)-98.5 °F (36.9 °C)] 97.9 °F (36.6 °C)  Pulse:  [] 108  Resp:  [16-18] 16  SpO2:  [96 %-99 %] 98 %  BP: (105-137)/(66-81) 137/81    GEN NAD  PULM unlabored  ABDOMEN gravid, palpates SOFT in between ctx  PIMPLE NOTED ON LEFT GLUTEAL AREA  EXT 1+ edema, nontender    SVE (PeriWATCH)  Dilation (cm): 5  Effacement (%): 80  Station: -2  Cervical Position: Mid Position  Cervical Consistency: Medium  Examined by:: Nayla  Chaperone in room: Melisa Dai  Vaginal Exam Comments: AROM clr fluid  Ramírez Score: 6  Simplified Ramírez Score: 7  Dose(units) Oxytocin: *6 maggie-units/min     EFM: Cat 1, 145 modBTV, +accel, no decel (reassuring, reactive)  TOCO:  ctx q4 mins    Fetal status is reassuring  Continue pitocin labor management  Neg GBS   Epidural PRN  Anticipate     Nandini Winslow MD  OB/GYN Hospitalist  11:02 PM 2023    ALSO HAS PAPULE ON LEG AND TREAT PP WITH ORAL ABX  PLANS TO BREASTFEED

## 2023-09-27 NOTE — NURSING
Pt requested tdap. However, she is allergic to latex. Our tdap vaccine is Contraindicated in people allergic to latex. Pt voiced understanding

## 2023-09-27 NOTE — PROGRESS NOTES
Pt ambulated to restroom, urinated without difficulty. Perineum cleansed, pads changed. TUKS & Dermaplast applied.

## 2023-09-27 NOTE — PLAN OF CARE
"  Problem:  Fall Injury Risk  Goal: Absence of Fall, Infant Drop and Related Injury  Outcome: Ongoing, Progressing     Problem: Adult Inpatient Plan of Care  Goal: Plan of Care Review  Outcome: Ongoing, Progressing  Goal: Patient-Specific Goal (Individualized)  Description: "I want to breastfeed"  Outcome: Ongoing, Progressing  Goal: Absence of Hospital-Acquired Illness or Injury  Outcome: Ongoing, Progressing  Goal: Optimal Comfort and Wellbeing  Outcome: Ongoing, Progressing  Goal: Readiness for Transition of Care  Outcome: Ongoing, Progressing     Problem: Infection  Goal: Absence of Infection Signs and Symptoms  Outcome: Ongoing, Progressing     Problem: Bariatric Environmental Safety  Goal: Safety Maintained with Care  Outcome: Ongoing, Progressing     Problem: Adjustment to Role Transition (Postpartum Vaginal Delivery)  Goal: Successful Maternal Role Transition  Outcome: Ongoing, Progressing     Problem: Bleeding (Postpartum Vaginal Delivery)  Goal: Hemostasis  Outcome: Ongoing, Progressing     Problem: Infection (Postpartum Vaginal Delivery)  Goal: Absence of Infection Signs/Symptoms  Outcome: Ongoing, Progressing     Problem: Pain (Postpartum Vaginal Delivery)  Goal: Acceptable Pain Control  Outcome: Ongoing, Progressing     Problem: Urinary Retention (Postpartum Vaginal Delivery)  Goal: Effective Urinary Elimination  Outcome: Ongoing, Progressing     "

## 2023-09-27 NOTE — LACTATION NOTE
Encouraged frequent feeds on cue, discussed early hunger cues. Encouraged waking baby if needed to ensure 8 or more feeds per 24 hrs. Tips on waking sleepy baby discussed. Signs of milk transfer/adequate intake discussed. Encouraged to call with any signs indicating a problem, such as painful latch, nipple irritation, unable to sustain latch, or with any questions or needs.      Instructed on the signs of an effective feeding.  Discussed positioning, comfortable latch, rhythmic, nutritive sucking, audible swallows, appropriate length of feeding, comfort of latch and evaluating for fullness cues.  Also discussed appropriate output for age.  Pt states understanding and verbalized appropriate recall.

## 2023-09-27 NOTE — NURSING
Dr griffith spoke with patient and patient decided to get the BTL outpatient in 6 weeks. Dr. Griffith d/c'd epidural catheter at . Tolerated well

## 2023-09-27 NOTE — L&D DELIVERY NOTE
[facility] - Labor and Delivery  OBGYN  Operative Note    SUMMARY     Date of Procedure: 2023    Procedure: Spontaneous Vaginal Delivery    Surgeon: Nandini Winslow MD OB Hospitalist    Pre-Operative Diagnosis:   Patient Active Problem List   Diagnosis    1. 38w3d Prior poor obstetrical history in third trimester, antepartum    2. BMI affecting pregnancy in third trimester    3. Anxiety/depression during pregnancy in third trimester    GERD (gastroesophageal reflux disease)    Allergic rhinitis    Uterine contractions during pregnancy    Unwanted fertility       Post-Operative Diagnosis:   Same with addition of  Single liveborn infant now s/p  38w3d without complications  2. 1st degree perineal laceration with repair    Anesthesia: epidural    Procedure in Detail: With good maternal effort a viable liveborn infant was delivered after approximately 2 minutes of pushing. The fetal head delivered in direct OA position.  Nuchal cord was not present. Remainder of infant delivered without difficulty with right shoulder anterior followed by remaining body.    placed on maternal abdomen and bulb suctioning performed. 3VC was cut and clamped after 60 sec delay and cord blood obtained. The vagina, perineum, and cervix were examined. The placenta then delivered spontaneously and was noted to be intact. Lacerations were present per above. After any necessary repairs were performed, all instruments and sponges were removed from the vagina. Sponge, lap, and needle counts were correct after the procedure.    Repair Suture: 2.0 vicryl    Placenta: intact and normal appearing    Infant: Liveborn male infant with APGARS of 8/8; 3 vessel umbilical cord. Moving both upper extremities well.   [facility] team present at bedside. Weight not yet obtained at time of this note    Complications: No    Estimated Blood Loss (EBL): 250 cc           Condition: Mother and baby bonding well    A qualified resident was not  available to assist.      Indications:  (spontaneous vaginal delivery)  Pregnancy complicated by:   Patient Active Problem List   Diagnosis    1. Prior poor obstetrical history in third trimester, antepartum    2. BMI affecting pregnancy in third trimester    3. Anxiety/depression during pregnancy in third trimester    GERD (gastroesophageal reflux disease)    Allergic rhinitis    Uterine contractions during pregnancy     (spontaneous vaginal delivery)    Unwanted fertility     Admitting GA: 38w3d    Delivery Information for Anjel Camilo    Birth information:  YOB: 2021   Time of birth: 3:00 AM   Sex: male   Head Delivery Date/Time: 2023  3:00 AM   Delivery type: Vaginal, Spontaneous   Gestational Age: 38w3d        Delivery Providers    Delivering clinician: Nandini Winslow MD   Provider Role    Melisa Deutsch RN Redfern, Molly, RN Bretz, Emme, RN               Measurements    Weight:   Length:          Apgars    Living status:   Apgar Component Scores:  1 min.:  5 min.:  10 min.:  15 min.:  20 min.:    Skin color:         Heart rate:         Reflex irritability:         Muscle tone:         Respiratory effort:         Total:                  Operative Delivery    Forceps attempted?: No  Vacuum extractor attempted?: No         Shoulder Dystocia    Shoulder dystocia present?: No           Presentation    Presentation: Vertex  Position: Middle           Interventions/Resuscitation           Cord    Vessels: 3 vessels  Complications: None  Delayed Cord Clamping?: No  Cord Blood Disposition: Sent with Baby  Gases Sent?: No  Stem Cell Collection (by MD): No       Placenta    Placenta delivery date/time: 2023 0304  Placenta removal: Spontaneous  Placenta appearance: Intact  Placenta disposition: Discarded           Labor Events:       labor: No     Labor Onset Date/Time: 2023 19:00     Dilation Complete Date/Time: 2023 02:47     Start Pushing  Date/Time: 2023 02:56     Rupture Date/Time: 23         Rupture type: ARM (Artificial Rupture)         Fluid Amount:       Fluid Color: Clear       steroids: None     Antibiotics given for GBS: No     Induction:       Indications for induction:        Augmentation: amniotomy;oxytocin     Indications for augmentation: Ineffective Contraction Pattern     Labor complications: None     Additional complications:          Cervical ripening:                     Delivery:      Episiotomy: None     Indication for Episiotomy:       Perineal Lacerations: 1st Repaired:      Periurethral Laceration:   Repaired:     Labial Laceration:   Repaired:     Sulcus Laceration:   Repaired:     Vaginal Laceration:   Repaired:     Cervical Laceration:   Repaired:     Repair suture:       Repair # of packets: 1     Last Value - EBL - Nursing (mL):       Sum - EBL - Nursing (mL): 0     Last Value - EBL - Anesthesia (mL):      Calculated QBL (mL): 200      Vaginal Sweep Performed: Yes     Surgicount Correct: Yes       Other providers:       Anesthesia    Method: Epidural          Details (if applicable):  Trial of Labor      Categorization:      Priority:     Indications for :     Incision Type:       Additional  information:  Forceps:    Vacuum:    Breech:    Observed anomalies    Other (Comments):

## 2023-09-27 NOTE — DISCHARGE INSTRUCTIONS
"[facility]        [phone]  Discharge Instructions    Watch for early feeding cues (rooting, hand to mouth, smacking lips, sticking out tongue). Offer the breast at the first signs of hunger. Crying is a late sign of hunger; don't wait until then.    Feed your baby at least 8-12 times in a 24-hour period. Feeding early and often will ensure a plentiful milk supply for you and your baby and will prevent engorgement in the coming days.  Do not limit or schedule feedings.    "Cluster feeding" is normal; baby may nurse very often for several times in a row. This commonly occurs in the evening or early part of the night.    Allow your baby to finish one side before offering the other. You can try to burp the baby and then offer the other breast if he/ she seems to still be hungry.     Skin to skin contact helps a sleepy baby want to nurse. Babies who are frequently held skin to skin nurse better and longer. Skin to skin increases mom's milk-making hormone levels as well. Skin to skin can help calm baby too.     By the end of the first week, you want to see 6-8 wet diapers per day and 3-5 yellow, seedy stools (stools will change from black to green to yellow by the end of the 1st week. Refer to chart in breastfeeding booklet to see how many wet/ dirty diapers baby should be having each day. Notify pediatrician if baby is not having enough wet and dirty diapers.    It is best to avoid bottles and pacifiers for the first 4 weeks while getting breastfeeding established.     Back to work or school: 4 weeks is a good time to start pumping after morning feeds in order to store milk for baby, although you may pump before if needed. Around 4-6 weeks is a good time to introduce a bottle of pumped milk to baby if you will go back to work or school.     You should feel a tugging or pulling sensation when your baby nurses; it should never feel sharp, pinching, or singing. If there is pain, try to adjust the latch. Make " sure your baby opens his mouth wide to latch on. His lips should be flanged out, like a fish. (You may want to refer to the handouts in your packet or view latch videos at [employer] or [employer].    Listen for swallowing. This indicates your baby is transferring that milk!     Your milk will increase between days 3-5. Frequent feeds can help with engorgement.     If your breasts begin to get engorged, place warm cloths on them or  a warm shower before feeding. This will help the milk begin to flow. Feed often to drain the breasts. After feeding, you may use cold packs for 10-15 minutes to reduce swelling. You may also want to pump for comfort; don't overdo it- just pump enough to relieve the fullness.     No soap or lotions to the nipples except for medical grade lanolin or nipple cream for soreness.     All babies go through growth spurts. The first one is generally around 2-3 weeks. If your baby starts to nurse a lot more than usual, this is likely the reason. Growth spurts happen every so often and usually last for 3-5 days.     Remember to check the safety of any medications, prescription or non-prescription (including herbals), before you take them. Your baby's pediatrician is the best one to confirm the safety of the medication while you are breastfeeding. You may also phone us. We can tell you about safety ratings that have been published regarding a particular medication. You may wish to phone the [facility] at [phone] to check the safety of a medication.     Call with any questions or concerns. Don't wait-- ask for help early. Breastfeeding Resources can be found on the last few pages of your Breastfeeding Booklet given to you in the hospital.

## 2023-09-27 NOTE — PLAN OF CARE
Problem: Breastfeeding  Goal: Effective Breastfeeding  Outcome: Ongoing, Progressing  Intervention: Promote Effective Breastfeeding  Flowsheets (Taken 9/27/2023 1400)  Breastfeeding Assistance:   feeding cue recognition promoted   feeding on demand promoted   support offered  Parent/Child Attachment Promotion:   skin-to-skin contact encouraged   strengths emphasized   positive reinforcement provided  Intervention: Support Exclusive Breastfeeding Success  Flowsheets (Taken 9/27/2023 1400)  Breastfeeding Support:   maternal hydration promoted   encouragement provided   maternal nutrition promoted   diary/feeding log utilized   infant-mother separation minimized   maternal rest encouraged   lactation counseling provided

## 2023-09-28 PROCEDURE — 25000003 PHARM REV CODE 250: Performed by: OBSTETRICS & GYNECOLOGY

## 2023-09-28 PROCEDURE — 11000001 HC ACUTE MED/SURG PRIVATE ROOM

## 2023-09-28 RX ORDER — SERTRALINE HYDROCHLORIDE 50 MG/1
50 TABLET, FILM COATED ORAL DAILY
Status: DISCONTINUED | OUTPATIENT
Start: 2023-09-28 | End: 2023-09-29 | Stop reason: HOSPADM

## 2023-09-28 RX ADMIN — OXYCODONE HYDROCHLORIDE AND ACETAMINOPHEN 1 TABLET: 5; 325 TABLET ORAL at 08:09

## 2023-09-28 RX ADMIN — IBUPROFEN 600 MG: 600 TABLET, FILM COATED ORAL at 12:09

## 2023-09-28 RX ADMIN — OXYCODONE AND ACETAMINOPHEN 1 TABLET: 10; 325 TABLET ORAL at 07:09

## 2023-09-28 RX ADMIN — IBUPROFEN 600 MG: 600 TABLET, FILM COATED ORAL at 06:09

## 2023-09-28 RX ADMIN — PRENATAL VITAMINS-IRON FUMARATE 27 MG IRON-FOLIC ACID 0.8 MG TABLET 1 TABLET: at 08:09

## 2023-09-28 RX ADMIN — IBUPROFEN 600 MG: 600 TABLET, FILM COATED ORAL at 05:09

## 2023-09-28 RX ADMIN — SERTRALINE HYDROCHLORIDE 50 MG: 50 TABLET ORAL at 08:09

## 2023-09-28 RX ADMIN — IBUPROFEN 600 MG: 600 TABLET, FILM COATED ORAL at 01:09

## 2023-09-28 NOTE — PROGRESS NOTES
I have personally reviewed the review of systems (ROS) and past, family and social histories (PFSH) documented above by the resident.  I have reviewed the care furnished by the resident during the encounter, including a review of the patient's medical history, the resident's findings on physical examination, diagnosis, and the treatment plan.  I participated in the management of the patient and was immediately available throughout the encounter.   I was physically present during all key portions of the service(s) provided with the resident.  Services were furnished in a primary care center located in the outpatient department of a [facility].

## 2023-09-28 NOTE — PROGRESS NOTES
Post Vaginal Delivery Progress Note:     Quan Camilo is a 33 y.o.   s/p , PPD#1. Patient resting comfortably in bed. Pain is well controlled. Lochia similar to her menses. Tolerating regular diet without nausea/vomiting. Ambulating comfortably, voiding spontaneously, and passing flatus.  Infant at bedside. Patient will be getting interval BTL.    Denies headache, blurry vision, dizziness, chest pain, shortness of breath, RUQ pain, or calf pain. .    Objective:     Vitals:    23 2032 23 0052 23 0732 23 0814   BP:  111/70 111/75    Pulse:  75 69    Resp: 18  17 14   Temp:  97.6 °F (36.4 °C) 97.8 °F (36.6 °C)    TempSrc:  Oral Oral    SpO2:           General:  Alert and oriented, in no acute distress   Lungs:  Clear to auscultation bilaterally   Heart::  Regular rate and rhythm   Abdomen:   Soft, nondistended, normoactive bowel sounds    Pelvic:  Scant blood present on pad    Uterine Fundus:   Firm, below the umbilicus    Extremities:  No cords, erythema, warmth, or tenderness to palpation in bilateral lower extremities; trace edema      Labs  No results found for this or any previous visit (from the past 24 hour(s)).    Trended Lab Data:  Recent Labs   Lab 23  1608 23  0639   WBC 8.09 13.19*   HGB 13.9 11.9*    222   MCV 85.6 85.2       Medications   prenatal vitamin  1 tablet Oral Daily    sertraline  50 mg Oral Daily    sodium citrate-citric acid 500-334 mg/5 ml  30 mL Oral Once      benzocaine-lanolin      fentanyl 2 mcg/mL with BUPivacaine 0.125% in sodium chloride 0.9% Epidural 12 mL/hr (23 0000)       Assessment/Plan  33 y.o.  s/p , PPD# 1. Clinically stable and doing well. Pregnancy/postpartum course complicated by:     Continue routine postpartum care.   Asymptomatic anemia:  Antepartum H/H 13.9/41.5 --> [id number]--> postpartum H/H 11.9/35.8.   No signs/symptoms of anemia this AM, continue to monitor  Feeding: Breastfeeding,  continue to encourage.  Pain: scheduled ibuprofen and tylenol, PRN oxycodone for breakthrough pain.  PPBCM: Will obtain interval bilateral tubal ligation in 6 weeks, ambulatory referral to GYN  Dispo: continue to monitor, anticipate discharge to home on postpartum day #2 if meeting all goals.    discussed patient and plan of care with Dr. Neida Santos MD  [facility] HO-1

## 2023-09-29 VITALS
RESPIRATION RATE: 17 BRPM | TEMPERATURE: 98 F | DIASTOLIC BLOOD PRESSURE: 80 MMHG | OXYGEN SATURATION: 96 % | HEART RATE: 76 BPM | SYSTOLIC BLOOD PRESSURE: 123 MMHG

## 2023-09-29 PROBLEM — O47.9 UTERINE CONTRACTIONS DURING PREGNANCY: Status: RESOLVED | Noted: 2023-09-26 | Resolved: 2023-09-29

## 2023-09-29 PROCEDURE — 25000003 PHARM REV CODE 250: Performed by: OBSTETRICS & GYNECOLOGY

## 2023-09-29 RX ORDER — IBUPROFEN 600 MG/1
600 TABLET ORAL EVERY 6 HOURS PRN
Qty: 40 TABLET | Refills: 0 | Status: SHIPPED | OUTPATIENT
Start: 2023-09-29 | End: 2023-10-09

## 2023-09-29 RX ORDER — ACETAMINOPHEN 325 MG/1
650 TABLET ORAL EVERY 6 HOURS PRN
Qty: 40 TABLET | Refills: 0 | Status: SHIPPED | OUTPATIENT
Start: 2023-09-29 | End: 2023-10-04

## 2023-09-29 RX ORDER — IBUPROFEN 600 MG/1
600 TABLET ORAL EVERY 6 HOURS PRN
Qty: 40 TABLET | Refills: 0 | Status: SHIPPED | OUTPATIENT
Start: 2023-09-29 | End: 2023-09-29 | Stop reason: SDUPTHER

## 2023-09-29 RX ORDER — DOCUSATE SODIUM 100 MG/1
200 CAPSULE, LIQUID FILLED ORAL 2 TIMES DAILY PRN
Qty: 28 CAPSULE | Refills: 0 | Status: SHIPPED | OUTPATIENT
Start: 2023-09-29 | End: 2023-10-13

## 2023-09-29 RX ORDER — OXYCODONE AND ACETAMINOPHEN 5; 325 MG/1; MG/1
1 TABLET ORAL EVERY 6 HOURS PRN
Qty: 20 TABLET | Refills: 0 | Status: SHIPPED | OUTPATIENT
Start: 2023-09-29 | End: 2023-09-29 | Stop reason: SDUPTHER

## 2023-09-29 RX ORDER — OXYCODONE AND ACETAMINOPHEN 5; 325 MG/1; MG/1
1 TABLET ORAL EVERY 6 HOURS PRN
Qty: 20 TABLET | Refills: 0 | Status: SHIPPED | OUTPATIENT
Start: 2023-09-29 | End: 2023-10-04

## 2023-09-29 RX ORDER — DOCUSATE SODIUM 100 MG/1
200 CAPSULE, LIQUID FILLED ORAL 2 TIMES DAILY PRN
Qty: 28 CAPSULE | Refills: 0 | Status: SHIPPED | OUTPATIENT
Start: 2023-09-29 | End: 2023-09-29 | Stop reason: SDUPTHER

## 2023-09-29 RX ORDER — ACETAMINOPHEN 325 MG/1
650 TABLET ORAL EVERY 6 HOURS PRN
Qty: 40 TABLET | Refills: 0 | Status: SHIPPED | OUTPATIENT
Start: 2023-09-29 | End: 2023-09-29 | Stop reason: SDUPTHER

## 2023-09-29 RX ADMIN — SERTRALINE HYDROCHLORIDE 50 MG: 50 TABLET ORAL at 10:09

## 2023-09-29 RX ADMIN — OXYCODONE AND ACETAMINOPHEN 1 TABLET: 10; 325 TABLET ORAL at 10:09

## 2023-09-29 RX ADMIN — IBUPROFEN 600 MG: 600 TABLET, FILM COATED ORAL at 05:09

## 2023-09-29 RX ADMIN — IBUPROFEN 600 MG: 600 TABLET, FILM COATED ORAL at 12:09

## 2023-09-29 NOTE — PLAN OF CARE
"  Problem: Adult Inpatient Plan of Care  Goal: Plan of Care Review  Outcome: Ongoing, Progressing  Goal: Patient-Specific Goal (Individualized)  Description: "I want to breastfeed"  Outcome: Ongoing, Progressing  Goal: Absence of Hospital-Acquired Illness or Injury  Outcome: Ongoing, Progressing  Goal: Optimal Comfort and Wellbeing  Outcome: Ongoing, Progressing  Goal: Readiness for Transition of Care  Outcome: Ongoing, Progressing     "

## 2023-09-29 NOTE — LACTATION NOTE
Mom says breastfeeding is going well. Mom says her milk is in and she's been leaking. Has already been given nipple pads. Educated mom on discharge instructions, and mom verbalized understanding.

## 2023-09-29 NOTE — CONSULTS
"SW received consult for [place] assessment. Patient's score was a 15. Met with patient/mom in her post-partum room. I introduced myself and my role and mom was agreeable to meet. Mom presented appropriate and was cooperative and pleasant. FOB, Mr. Zackary Cisneros was being supportive at mom's bedside and mom was agreeable to meet with him present. Mom reported this is her 4th baby and she has a 5, 6, and 6 y/o at home. Mom reported to a hx of PPD. We discussed current symptoms and mom denied any concerning symptoms and stated, "I'm just physically tired." We discussed self-care and identifying support and mom reported she has an extended amount of support from her sisters outside of dad. Mom reported to medication tx and reported to starting zoloft. I provided mom with support resources and encouraged mom to vocalize any concerns. Mom verbally expressed her understanding. Mom denied having any additional questions or concerns at this time.     Verified Face sheet information:   [address]   [place] [id number]  [phone]    Vag. Delivery  Baby Boy: Maxi Cisneros  "

## 2023-09-29 NOTE — PLAN OF CARE
"  Problem:  Fall Injury Risk  Goal: Absence of Fall, Infant Drop and Related Injury  Outcome: Ongoing, Progressing     Problem: Adult Inpatient Plan of Care  Goal: Plan of Care Review  Outcome: Ongoing, Progressing  Goal: Patient-Specific Goal (Individualized)  Description: "I want to breastfeed"  Outcome: Ongoing, Progressing  Goal: Absence of Hospital-Acquired Illness or Injury  Outcome: Ongoing, Progressing  Goal: Optimal Comfort and Wellbeing  Outcome: Ongoing, Progressing  Goal: Readiness for Transition of Care  Outcome: Ongoing, Progressing     Problem: Infection  Goal: Absence of Infection Signs and Symptoms  Outcome: Ongoing, Progressing     Problem: Bariatric Environmental Safety  Goal: Safety Maintained with Care  Outcome: Ongoing, Progressing     Problem: Adjustment to Role Transition (Postpartum Vaginal Delivery)  Goal: Successful Maternal Role Transition  Outcome: Ongoing, Progressing     Problem: Bleeding (Postpartum Vaginal Delivery)  Goal: Hemostasis  Outcome: Ongoing, Progressing     Problem: Infection (Postpartum Vaginal Delivery)  Goal: Absence of Infection Signs/Symptoms  Outcome: Ongoing, Progressing     Problem: Pain (Postpartum Vaginal Delivery)  Goal: Acceptable Pain Control  Outcome: Ongoing, Progressing     Problem: Urinary Retention (Postpartum Vaginal Delivery)  Goal: Effective Urinary Elimination  Outcome: Ongoing, Progressing     Problem: Breastfeeding  Goal: Effective Breastfeeding  Outcome: Ongoing, Progressing     "

## 2023-09-29 NOTE — DISCHARGE SUMMARY
Delivery Discharge Summary  Obstetrics      Primary OB Clinician: Dr. De Anda    Admission date: 2023  Discharge date: 2023    Disposition: To home, self care    Discharge Diagnosis List:      Patient Active Problem List   Diagnosis    1. Prior poor obstetrical history in third trimester, antepartum    2. BMI affecting pregnancy in third trimester    3. Anxiety/depression during pregnancy in third trimester    GERD (gastroesophageal reflux disease)    Allergic rhinitis     (spontaneous vaginal delivery)    Unwanted fertility       Procedure:     Hospital Course:  Earlene Camilo is a 33 y.o. now , PPD #2 who was admitted on 2023 . Patient was subsequently admitted to labor and delivery unit with signed consents.     Labor course was uncomplicated and resulted in  without complications.     Please see delivery note for further details. Her postpartum course was uncomplicated. On discharge day, patient's pain is controlled with oral pain medications. Pt is tolerating ambulation without SOB or CP, and regular diet without N/V. Reports lochia is mild. Denies any HA, vision changes, F/C, LE swelling. Denies any breast pain/soreness.    Pt in stable condition and ready for discharge. She has been instructed to start and/or continue medications and follow up with her obstetrics provider as listed below.    Pertinent studies:  CBC  Recent Labs   Lab 23  1608 23  0639   WBC 8.09 13.19*   HGB 13.9 11.9*   HCT 41.5 35.8*   MCV 85.6 85.2    222        Exam:  General:  Alert and oriented, in no acute distress   Lungs:  Clear to auscultation bilaterally   Heart::  Regular rate and rhythm   Abdomen:   Soft, nondistended, normoactive bowel sounds    Pelvic:  Scant blood present on pad    Uterine Fundus:   Firm, below the umbilicus    Extremities:  No cords, erythema, warmth, or tenderness to palpation in bilateral lower extremities; trace edema           Immunization History  "  Administered Date(s) Administered    DTaP 1992, 1992, 1993, 1994, 1997    HIB 1993    Hepatitis B, Pediatric/Adolescent 06/15/2006, 2006, 2006    HiB PRP-OMP 1994    Hib-HbOC 1992, 1992    Influenza - Quadrivalent - PF *Preferred* (6 months and older) 2017, 2023    MMR 1994, 1997    Meningococcal Conjugate (MCV4P) 06/15/2006    OPV 1992, 1992, 1994, 1997    Tdap 06/15/2006, 2018, 2022        Delivery:    Episiotomy: None   Lacerations: 1st   Repair suture:     Repair # of packets: 1   Blood loss (ml):       Birth information:  YOB: [birth year]23   Time of birth: 3:00 AM   Sex: male   Delivery type: Vaginal, Spontaneous   Gestational Age: 38w3d     Measurements    Weight: 3770 g  Weight (lbs): 8 lb 5 oz  Length: 52.1 cm  Length (in): 20.5"  Head circumference: 34.9 cm         Delivery Clinician: Delivery Providers    Delivering clinician: Nandini iWnslow MD   Provider Role    Get, VONNIE Garcia Molly, RN Bretz, Emme, RN              Additional  information:  Forceps:    Vacuum:    Breech:    Observed anomalies      Living?:     Apgars    Living status: Living  Apgar Component Scores:  1 min.:  5 min.:  10 min.:  15 min.:  20 min.:    Skin color:  0  0       Heart rate:  2  2       Reflex irritability:  2  2       Muscle tone:  2  2       Respiratory effort:  2  2       Total:  8  8       Apgars assigned by: GÉNESIS MONTOYA RN         Placenta: Delivered:       appearance    Patient Instructions:   Current Discharge Medication List        START taking these medications    Details   acetaminophen (TYLENOL) 325 MG tablet Take 2 tablets (650 mg total) by mouth every 6 (six) hours as needed for Pain.  Qty: 40 tablet, Refills: 0      !! docusate sodium (COLACE) 100 MG capsule Take 2 capsules (200 mg total) by mouth 2 (two) times daily as needed for Constipation.  Qty: 28 " capsule, Refills: 0      ibuprofen (ADVIL,MOTRIN) 600 MG tablet Take 1 tablet (600 mg total) by mouth every 6 (six) hours as needed (cramping).  Qty: 40 tablet, Refills: 0      oxyCODONE-acetaminophen (PERCOCET) 5-325 mg per tablet Take 1 tablet by mouth every 6 (six) hours as needed for Pain.  Qty: 20 tablet, Refills: 0    Comments: Quantity prescribed more than 7 day supply? No       !! - Potential duplicate medications found. Please discuss with provider.        CONTINUE these medications which have NOT CHANGED    Details   aspirin (ECOTRIN) 81 MG EC tablet Take 81 mg by mouth once daily. Started when she was 3 months pregnant      !! docusate sodium (COLACE) 100 MG capsule Take 100 mg by mouth 2 (two) times daily as needed for Constipation.      famotidine (PEPCID) 20 MG tablet Take 1 tablet (20 mg total) by mouth nightly as needed for Heartburn.  Qty: 30 tablet, Refills: 2    Associated Diagnoses: Gastroesophageal reflux disease without esophagitis      fluticasone propionate (FLONASE) 50 mcg/actuation nasal spray fluticasone propionate Take 2 Spray(s) (nasal) 1 time per day for 30 days 2 sprays each nostril once daily. 20220516 spray,suspension 1 time per day nasal 30 days active 50 mcg/actuation      lansoprazole (PREVACID) 30 MG capsule Take 1 capsule (30 mg total) by mouth once daily.  Qty: 30 capsule, Refills: 11      pyridoxine, vitamin B6, (VITAMIN B-6) 25 MG Tab Take 1 tablet (25 mg total) by mouth once daily.  Qty: 30 tablet, Refills: 1    Associated Diagnoses: Gastroesophageal reflux disease without esophagitis      sertraline (ZOLOFT) 50 MG tablet Take 1 tablet (50 mg total) by mouth every evening.  Qty: 90 tablet, Refills: 0    Associated Diagnoses: Mental disorder during pregnancy in third trimester       !! - Potential duplicate medications found. Please discuss with provider.        STOP taking these medications       amoxicillin-clavulanate 875-125mg (AUGMENTIN) 875-125 mg per tablet Comments:    Reason for Stopping:         PRENATAL VITAMIN 27 mg iron- 0.8 mg Tab Comments:   Reason for Stopping:               Discharge Procedure Orders   Ambulatory referral/consult to Gynecology   Standing Status: Future   Referral Priority: Routine Referral Type: Consultation   Referral Reason: Specialty Services Required   Requested Specialty: Gynecology   Number of Visits Requested: 1     Diet Adult Regular     Notify your health care provider if you experience any of the following:  temperature >100.4     Notify your health care provider if you experience any of the following:  severe uncontrolled pain     Notify your health care provider if you experience any of the following:  persistent nausea and vomiting or diarrhea     Notify your health care provider if you experience any of the following:     Notify your health care provider if you experience any of the following:  increased confusion or weakness     Notify your health care provider if you experience any of the following:  persistent dizziness, light-headedness, or visual disturbances     Notify your health care provider if you experience any of the following:  worsening rash     Notify your health care provider if you experience any of the following:  severe persistent headache     Notify your health care provider if you experience any of the following:  difficulty breathing or increased cough     Notify your health care provider if you experience any of the following:  redness, tenderness, or signs of infection (pain, swelling, redness, odor or green/yellow discharge around incision site)     No dressing needed     Activity as tolerated        Follow-up Information       Daniel De Anda MD. Schedule an appointment as soon as possible for a visit in 6 week(s).    Specialty: Obstetrics and Gynecology  Contact information:  [facility]  [facility] [id number] [phone]                              Follow-up will be at [facility].   Follow-up will be in approximately 6 weeks.  ER precautions were reviewed with the patient and she was given opportunity to ask questions.  Stable for discharge

## 2023-10-04 ENCOUNTER — PATIENT OUTREACH (OUTPATIENT)
Dept: FAMILY MEDICINE | Facility: CLINIC | Age: 33
End: 2023-10-04
Payer: MEDICAID

## 2023-10-04 NOTE — PROGRESS NOTES
Follow-up:    Appointment reminder given for         Patient verbalized understanding.        Other comments:  Identity verified.  Congratulated pt on the birth of her son Maxi Dupont 9/27/2023.  She states she is eating, drinking, had BM 10/3/2023 with moderate vaginal bleeding.  She states the baby is breastfeeding well.  Reminded patient of post partum appt 11/16/23 1330.  Pt denies SI/HI.  Pt has no PCP.  Offered to make an appt to establish care, patient accepted.  Patient denies any SDOH barriers at this time.  Stressed the importance of medication compliance, eating a healthy diet, drinking plenty of water, keeping appointments and reinforced the use of urgent care clinic for non emergent issues until PCP established.  Patient verbalized understanding to all instructions.     OB Navigation completed.  Encounter closed.                    Education given on

## 2023-10-18 ENCOUNTER — PATIENT MESSAGE (OUTPATIENT)
Dept: MATERNAL FETAL MEDICINE | Facility: CLINIC | Age: 33
End: 2023-10-18
Payer: MEDICAID

## 2023-11-16 ENCOUNTER — OFFICE VISIT (OUTPATIENT)
Dept: FAMILY MEDICINE | Facility: CLINIC | Age: 33
End: 2023-11-16
Payer: MEDICAID

## 2023-11-16 VITALS
BODY MASS INDEX: 44.1 KG/M2 | HEART RATE: 93 BPM | WEIGHT: 239.63 LBS | SYSTOLIC BLOOD PRESSURE: 110 MMHG | DIASTOLIC BLOOD PRESSURE: 67 MMHG | TEMPERATURE: 98 F | OXYGEN SATURATION: 95 % | HEIGHT: 62 IN

## 2023-11-16 DIAGNOSIS — F41.8 DEPRESSION WITH ANXIETY: ICD-10-CM

## 2023-11-16 PROCEDURE — 99213 OFFICE O/P EST LOW 20 MIN: CPT | Mod: PBBFAC

## 2023-11-16 NOTE — PROGRESS NOTES
"[facility]    Subjective:      Chief Complaint: Postpartum Care    HPI   Earlene Camilo is a 33 y.o.  014 s/p  complicated by first-degree perineal laceration with repair on 2023 who presents to [facility] for postpartum visit. No longer having lochia, no menses since delivery. Currently breastfeeding without issues, baby currently cluster feeding. Has been feeling fatigued, mood is stable, does have episodes of frustration where she just has to step away and take a breather.  works as [employer]  and is home on weekends only. Has PCP at [facility] with next appointment 10/5/2023. Planning to have BTL as soon as she can get it scheduled through [facility].    Review of Systems  As per HPI.    Objective:     /67 (BP Location: Left arm, Patient Position: Sitting, BP Method: Large (Automatic))   Pulse 93   Temp 98.1 °F (36.7 °C) (Oral)   Ht 5' 2" (1.575 m)   Wt 108.7 kg (239 lb 9.6 oz)   LMP  (LMP Unknown)   SpO2 95%   Breastfeeding Yes   BMI 43.82 kg/m²     Physical Exam:  Gen: no acute distress  CV: RRR, no murmurs. Trace LE edema.  Resp: CTAB, breathing non labored on room air.  Abd: Soft, non-distended, non-tender, bowel sounds normoactive.  MSK: ambulating spontaneously with appropriate ROM in all extremities.      Current Outpatient Medications:     sertraline (ZOLOFT) 50 MG tablet, Take 1 tablet (50 mg total) by mouth every evening., Disp: 90 tablet, Rfl: 0     Assessment/Plan:     Encounter for postpartum visit  Depression with anxiety  - [place] 10, decreased from 15 prior to discharge  - Offered increase in Zoloft however patient prefers to remain at 50 mg qd  - Has PCP at [facility], next appointment 2023  - BTL papers signed in 2023, patient wishes to call [facility] for scheduling procedure  - Continue breastfeeding per infant ques    Follow-up: ISAI Cowan MD   [facility] - PGY-II  "

## 2024-04-25 DIAGNOSIS — K21.9 GASTROESOPHAGEAL REFLUX DISEASE WITHOUT ESOPHAGITIS: Primary | ICD-10-CM

## 2024-04-25 RX ORDER — LANSOPRAZOLE 30 MG/1
30 CAPSULE, DELAYED RELEASE ORAL DAILY
COMMUNITY
Start: 2024-03-25

## 2024-04-25 RX ORDER — LANSOPRAZOLE 30 MG/1
30 CAPSULE, DELAYED RELEASE ORAL DAILY
Qty: 30 CAPSULE | Refills: 1 | OUTPATIENT
Start: 2024-04-25